# Patient Record
Sex: FEMALE | Race: BLACK OR AFRICAN AMERICAN | NOT HISPANIC OR LATINO | Employment: UNEMPLOYED | URBAN - METROPOLITAN AREA
[De-identification: names, ages, dates, MRNs, and addresses within clinical notes are randomized per-mention and may not be internally consistent; named-entity substitution may affect disease eponyms.]

---

## 2020-12-18 PROBLEM — R10.13 ACUTE EPIGASTRIC PAIN: Status: ACTIVE | Noted: 2020-12-18

## 2021-05-04 ENCOUNTER — PATIENT MESSAGE (OUTPATIENT)
Dept: RESEARCH | Facility: HOSPITAL | Age: 46
End: 2021-05-04

## 2021-05-10 ENCOUNTER — PATIENT MESSAGE (OUTPATIENT)
Dept: RESEARCH | Facility: HOSPITAL | Age: 46
End: 2021-05-10

## 2021-12-02 ENCOUNTER — OFFICE VISIT (OUTPATIENT)
Dept: FAMILY MEDICINE | Facility: HOSPITAL | Age: 46
End: 2021-12-02
Attending: FAMILY MEDICINE
Payer: MEDICAID

## 2021-12-02 ENCOUNTER — HOSPITAL ENCOUNTER (OUTPATIENT)
Dept: RADIOLOGY | Facility: HOSPITAL | Age: 46
Discharge: HOME | End: 2021-12-02
Attending: STUDENT IN AN ORGANIZED HEALTH CARE EDUCATION/TRAINING PROGRAM
Payer: MEDICAID

## 2021-12-02 VITALS
SYSTOLIC BLOOD PRESSURE: 132 MMHG | DIASTOLIC BLOOD PRESSURE: 87 MMHG | WEIGHT: 231.25 LBS | BODY MASS INDEX: 35.05 KG/M2 | HEIGHT: 68 IN | HEART RATE: 105 BPM

## 2021-12-02 DIAGNOSIS — M25.561 ACUTE PAIN OF RIGHT KNEE: ICD-10-CM

## 2021-12-02 DIAGNOSIS — E66.9 OBESITY, UNSPECIFIED CLASSIFICATION, UNSPECIFIED OBESITY TYPE, UNSPECIFIED WHETHER SERIOUS COMORBIDITY PRESENT: ICD-10-CM

## 2021-12-02 DIAGNOSIS — N92.6 IRREGULAR MENSES: ICD-10-CM

## 2021-12-02 DIAGNOSIS — R20.2 PARESTHESIA OF BOTH FEET: ICD-10-CM

## 2021-12-02 DIAGNOSIS — G43.109 MIGRAINE WITH AURA AND WITHOUT STATUS MIGRAINOSUS, NOT INTRACTABLE: ICD-10-CM

## 2021-12-02 DIAGNOSIS — M25.561 ACUTE PAIN OF RIGHT KNEE: Primary | ICD-10-CM

## 2021-12-02 DIAGNOSIS — M47.26 OSTEOARTHRITIS OF SPINE WITH RADICULOPATHY, LUMBAR REGION: ICD-10-CM

## 2021-12-02 PROCEDURE — 73562 X-RAY EXAM OF KNEE 3: CPT | Mod: TC,FY,RT

## 2021-12-02 PROCEDURE — 73562 X-RAY EXAM OF KNEE 3: CPT | Mod: 26,RT,, | Performed by: RADIOLOGY

## 2021-12-02 PROCEDURE — 99214 OFFICE O/P EST MOD 30 MIN: CPT | Performed by: STUDENT IN AN ORGANIZED HEALTH CARE EDUCATION/TRAINING PROGRAM

## 2021-12-02 PROCEDURE — 73562 XR KNEE 3 VIEW RIGHT: ICD-10-PCS | Mod: 26,RT,, | Performed by: RADIOLOGY

## 2022-01-05 ENCOUNTER — HOSPITAL ENCOUNTER (OUTPATIENT)
Dept: RADIOLOGY | Facility: HOSPITAL | Age: 47
Discharge: HOME | End: 2022-01-05
Attending: STUDENT IN AN ORGANIZED HEALTH CARE EDUCATION/TRAINING PROGRAM
Payer: MEDICAID

## 2022-01-05 DIAGNOSIS — N92.6 IRREGULAR MENSES: ICD-10-CM

## 2022-01-05 PROCEDURE — 77067 MAMMO DIGITAL SCREENING BILAT WITH TOMO: ICD-10-PCS | Mod: 26,,, | Performed by: RADIOLOGY

## 2022-01-05 PROCEDURE — 77063 BREAST TOMOSYNTHESIS BI: CPT | Mod: 26,,, | Performed by: RADIOLOGY

## 2022-01-05 PROCEDURE — 77067 SCR MAMMO BI INCL CAD: CPT | Mod: 26,,, | Performed by: RADIOLOGY

## 2022-01-05 PROCEDURE — 77063 MAMMO DIGITAL SCREENING BILAT WITH TOMO: ICD-10-PCS | Mod: 26,,, | Performed by: RADIOLOGY

## 2022-01-05 PROCEDURE — 77067 SCR MAMMO BI INCL CAD: CPT | Mod: TC

## 2022-01-05 PROCEDURE — 77063 BREAST TOMOSYNTHESIS BI: CPT | Mod: TC

## 2023-02-10 ENCOUNTER — OFFICE VISIT (OUTPATIENT)
Dept: URGENT CARE | Facility: CLINIC | Age: 48
End: 2023-02-10
Payer: MEDICAID

## 2023-02-10 VITALS
SYSTOLIC BLOOD PRESSURE: 122 MMHG | RESPIRATION RATE: 16 BRPM | BODY MASS INDEX: 35.01 KG/M2 | TEMPERATURE: 98 F | DIASTOLIC BLOOD PRESSURE: 84 MMHG | OXYGEN SATURATION: 97 % | HEIGHT: 68 IN | WEIGHT: 231 LBS | HEART RATE: 84 BPM

## 2023-02-10 DIAGNOSIS — M54.30 SCIATICA, UNSPECIFIED LATERALITY: ICD-10-CM

## 2023-02-10 DIAGNOSIS — Z79.899 HIGH RISK MEDICATION USE: Primary | ICD-10-CM

## 2023-02-10 LAB — GLUCOSE SERPL-MCNC: 80 MG/DL (ref 70–110)

## 2023-02-10 PROCEDURE — 3074F PR MOST RECENT SYSTOLIC BLOOD PRESSURE < 130 MM HG: ICD-10-PCS | Mod: CPTII,S$GLB,, | Performed by: FAMILY MEDICINE

## 2023-02-10 PROCEDURE — 3074F SYST BP LT 130 MM HG: CPT | Mod: CPTII,S$GLB,, | Performed by: FAMILY MEDICINE

## 2023-02-10 PROCEDURE — 3079F PR MOST RECENT DIASTOLIC BLOOD PRESSURE 80-89 MM HG: ICD-10-PCS | Mod: CPTII,S$GLB,, | Performed by: FAMILY MEDICINE

## 2023-02-10 PROCEDURE — 99213 PR OFFICE/OUTPT VISIT, EST, LEVL III, 20-29 MIN: ICD-10-PCS | Mod: S$GLB,,, | Performed by: FAMILY MEDICINE

## 2023-02-10 PROCEDURE — 1160F RVW MEDS BY RX/DR IN RCRD: CPT | Mod: CPTII,S$GLB,, | Performed by: FAMILY MEDICINE

## 2023-02-10 PROCEDURE — 82962 POCT GLUCOSE, HAND-HELD DEVICE: ICD-10-PCS | Mod: S$GLB,,, | Performed by: FAMILY MEDICINE

## 2023-02-10 PROCEDURE — 3008F BODY MASS INDEX DOCD: CPT | Mod: CPTII,S$GLB,, | Performed by: FAMILY MEDICINE

## 2023-02-10 PROCEDURE — 82962 GLUCOSE BLOOD TEST: CPT | Mod: S$GLB,,, | Performed by: FAMILY MEDICINE

## 2023-02-10 PROCEDURE — 99213 OFFICE O/P EST LOW 20 MIN: CPT | Mod: S$GLB,,, | Performed by: FAMILY MEDICINE

## 2023-02-10 PROCEDURE — 3079F DIAST BP 80-89 MM HG: CPT | Mod: CPTII,S$GLB,, | Performed by: FAMILY MEDICINE

## 2023-02-10 PROCEDURE — 1160F PR REVIEW ALL MEDS BY PRESCRIBER/CLIN PHARMACIST DOCUMENTED: ICD-10-PCS | Mod: CPTII,S$GLB,, | Performed by: FAMILY MEDICINE

## 2023-02-10 PROCEDURE — 1159F MED LIST DOCD IN RCRD: CPT | Mod: CPTII,S$GLB,, | Performed by: FAMILY MEDICINE

## 2023-02-10 PROCEDURE — 1159F PR MEDICATION LIST DOCUMENTED IN MEDICAL RECORD: ICD-10-PCS | Mod: CPTII,S$GLB,, | Performed by: FAMILY MEDICINE

## 2023-02-10 PROCEDURE — 3008F PR BODY MASS INDEX (BMI) DOCUMENTED: ICD-10-PCS | Mod: CPTII,S$GLB,, | Performed by: FAMILY MEDICINE

## 2023-02-10 RX ORDER — METHYLPREDNISOLONE 4 MG/1
TABLET ORAL
Qty: 21 EACH | Refills: 0 | Status: SHIPPED | OUTPATIENT
Start: 2023-02-10 | End: 2023-03-03

## 2023-02-10 RX ORDER — TIZANIDINE 4 MG/1
4 TABLET ORAL EVERY 8 HOURS
Qty: 30 TABLET | Refills: 0 | Status: SHIPPED | OUTPATIENT
Start: 2023-02-10 | End: 2023-02-20

## 2023-02-10 RX ORDER — TRAMADOL HYDROCHLORIDE 50 MG/1
50 TABLET ORAL EVERY 6 HOURS
Qty: 21 EACH | Refills: 0 | Status: SHIPPED | OUTPATIENT
Start: 2023-02-10 | End: 2023-08-04

## 2023-02-10 NOTE — PROGRESS NOTES
"Subjective:       Patient ID: Pilar Lester is a 47 y.o. female.    Vitals:  height is 5' 8" (1.727 m) and weight is 104.8 kg (231 lb).     Chief Complaint: Sciatica    Patient presents to the clinic with sciatic nerve pain x Sunday.  Not getting better, has gotten worse, robaxin not helping, gabapentin not helping      Pain  This is a recurrent problem. The current episode started in the past 7 days. The problem occurs constantly. The problem has been gradually worsening. Associated symptoms include numbness. The symptoms are aggravated by twisting, walking, stress, standing, bending and exertion. She has tried NSAIDs (gabapentin, muscle relaxer) for the symptoms. The treatment provided no relief.     Constitution: Negative.   HENT: Negative.     Neck: neck negative.   Cardiovascular: Negative.    Eyes: Negative.    Respiratory: Negative.     Gastrointestinal: Negative.    Endocrine: negative.   Genitourinary: Negative.    Musculoskeletal:  Positive for pain, back pain and muscle cramps.   Allergic/Immunologic: Negative.    Neurological:  Positive for numbness and tingling.   Hematologic/Lymphatic: Negative.    Psychiatric/Behavioral: Negative.       Objective:      Physical Exam   Constitutional: She is oriented to person, place, and time. She does not appear ill. She appears distressed. obesity  HENT:   Head: Normocephalic and atraumatic.   Nose: No rhinorrhea or congestion.   Eyes: Conjunctivae are normal. Pupils are equal, round, and reactive to light. Extraocular movement intact   Neck: Neck supple.   Cardiovascular: Normal pulses.   Pulmonary/Chest: Effort normal. No stridor. No respiratory distress.   Abdominal: Normal appearance.   Musculoskeletal:         General: Tenderness present.      Right upper leg: She exhibits tenderness. She exhibits no swelling.      Left upper leg: Normal. She exhibits no swelling.      Right lower leg: She exhibits tenderness. She exhibits no swelling. No edema.      Left " lower leg: Normal.   Neurological: no focal deficit. She is alert, oriented to person, place, and time and at baseline.   Skin: Skin is warm and dry. Capillary refill takes less than 2 seconds.   Psychiatric: Her behavior is normal. Mood, judgment and thought content normal.       Assessment:Plan:     1. High risk medication use  - POCT Glucose, Hand-Held Device    2. Sciatica, unspecified laterality  - methylPREDNISolone (MEDROL DOSEPACK) 4 mg tablet; use as directed  Dispense: 21 each; Refill: 0  - tiZANidine (ZANAFLEX) 4 MG tablet; Take 1 tablet (4 mg total) by mouth every 8 (eight) hours. for 10 days  Dispense: 30 tablet; Refill: 0  - traMADoL (ULTRAM) 50 mg tablet; Take 1 tablet (50 mg total) by mouth every 6 (six) hours.  Dispense: 21 each; Refill: 0     All results discussed with patient prior to discharge from clinic

## 2023-05-08 ENCOUNTER — OFFICE VISIT (OUTPATIENT)
Dept: URGENT CARE | Facility: CLINIC | Age: 48
End: 2023-05-08
Payer: MEDICAID

## 2023-05-08 VITALS
RESPIRATION RATE: 19 BRPM | BODY MASS INDEX: 35.01 KG/M2 | SYSTOLIC BLOOD PRESSURE: 122 MMHG | DIASTOLIC BLOOD PRESSURE: 84 MMHG | WEIGHT: 231 LBS | OXYGEN SATURATION: 97 % | HEIGHT: 68 IN | HEART RATE: 91 BPM | TEMPERATURE: 98 F

## 2023-05-08 DIAGNOSIS — R25.2 LEG CRAMPS: Primary | ICD-10-CM

## 2023-05-08 PROCEDURE — 99203 PR OFFICE/OUTPT VISIT, NEW, LEVL III, 30-44 MIN: ICD-10-PCS | Mod: S$GLB,,, | Performed by: NURSE PRACTITIONER

## 2023-05-08 PROCEDURE — 99203 OFFICE O/P NEW LOW 30 MIN: CPT | Mod: S$GLB,,, | Performed by: NURSE PRACTITIONER

## 2023-05-08 RX ORDER — TIZANIDINE 4 MG/1
4 TABLET ORAL EVERY 12 HOURS PRN
Qty: 20 TABLET | Refills: 0 | Status: SHIPPED | OUTPATIENT
Start: 2023-05-08 | End: 2023-05-18

## 2023-05-08 NOTE — PROGRESS NOTES
"Subjective:      Patient ID: Pilar Lester is a 47 y.o. female.    Vitals:  height is 5' 8" (1.727 m) and weight is 104.8 kg (231 lb). Her temperature is 98.3 °F (36.8 °C). Her blood pressure is 122/84 and her pulse is 91. Her respiration is 19 and oxygen saturation is 97%.     Chief Complaint: Leg Pain (Right leg )    Pt present with right leg  ( LOWER CALF) pain with cramping and tingling in her toes that started on Friday. No Known trauma.     Provider note begins below:    Pt comes to the clinic with complaint of right lateral calf pain.  Worse since 3 days ago, but has had intermittently chronically.    Tried muscle relaxer and gabapentin in past with limited relief.  States she feels she has insufficient water intake but takes a multi vitamin. No difficulty with ambulation. Pain worse at night. No numbness or paresthesias.     No trauma or injury.    Leg Pain   The incident occurred 3 to 5 days ago. The incident occurred at home. There was no injury mechanism. The pain is present in the right leg. The quality of the pain is described as cramping and aching. The pain is at a severity of 4/10. The pain is mild. The pain has been Constant since onset. Associated symptoms include an inability to bear weight and tingling. Pertinent negatives include no numbness. She reports no foreign bodies present. Treatments tried: Gabapentin and Muscle relaxer. The treatment provided no relief.     Skin:  Negative for erythema.   Neurological:  Negative for numbness.    Objective:     Physical Exam   Constitutional: She is oriented to person, place, and time. She appears well-developed. No distress.   HENT:   Head: Normocephalic and atraumatic. Head is without abrasion, without contusion and without laceration.   Ears:   Right Ear: External ear normal.   Left Ear: External ear normal.   Nose: Nose normal.   Mouth/Throat: Oropharynx is clear and moist and mucous membranes are normal.   Eyes: Conjunctivae, EOM and lids are " normal. Pupils are equal, round, and reactive to light.   Neck: Trachea normal and phonation normal. Neck supple.   Cardiovascular: Normal rate, regular rhythm and normal heart sounds.   Pulses:       Posterior tibial pulses are 2+ on the right side.   Pulmonary/Chest: Effort normal and breath sounds normal. No stridor. No respiratory distress.   Musculoskeletal: Normal range of motion.         General: Normal range of motion.      Right lower leg: She exhibits no tenderness, no bony tenderness, no swelling and no laceration. No edema.        Legs:    Neurological: She is alert and oriented to person, place, and time.   Skin: Skin is warm, dry, intact and no rash. Capillary refill takes less than 2 seconds. not right lower legNo abrasion, No burn, No bruising, No erythema and No ecchymosis   Psychiatric: Her speech is normal and behavior is normal. Judgment and thought content normal.   Nursing note and vitals reviewed.    Assessment:     1. Leg cramps        Plan:       Leg cramps  -     tiZANidine (ZANAFLEX) 4 MG tablet; Take 1 tablet (4 mg total) by mouth every 12 (twelve) hours as needed (muscle cramps and spasms).  Dispense: 20 tablet; Refill: 0    Pt will follow up with her PCP for acute on chronic leg cramping. She will manage with NSAID and muscle relaxer and increase fluid intake (water).

## 2023-05-12 ENCOUNTER — OFFICE VISIT (OUTPATIENT)
Dept: FAMILY MEDICINE | Facility: HOSPITAL | Age: 48
End: 2023-05-12
Payer: MEDICAID

## 2023-05-12 VITALS
BODY MASS INDEX: 33.91 KG/M2 | WEIGHT: 223.75 LBS | HEART RATE: 88 BPM | DIASTOLIC BLOOD PRESSURE: 91 MMHG | SYSTOLIC BLOOD PRESSURE: 130 MMHG | HEIGHT: 68 IN

## 2023-05-12 DIAGNOSIS — F41.1 GAD (GENERALIZED ANXIETY DISORDER): Primary | ICD-10-CM

## 2023-05-12 DIAGNOSIS — Z12.11 ENCOUNTER FOR SCREENING FOR MALIGNANT NEOPLASM OF COLON: ICD-10-CM

## 2023-05-12 DIAGNOSIS — Z12.31 ENCOUNTER FOR SCREENING MAMMOGRAM FOR MALIGNANT NEOPLASM OF BREAST: ICD-10-CM

## 2023-05-12 DIAGNOSIS — Z11.4 ENCOUNTER FOR SCREENING FOR HIV: ICD-10-CM

## 2023-05-12 DIAGNOSIS — Z13.1 ENCOUNTER FOR SCREENING FOR DIABETES MELLITUS: ICD-10-CM

## 2023-05-12 DIAGNOSIS — Z11.59 ENCOUNTER FOR HEPATITIS C SCREENING TEST FOR LOW RISK PATIENT: ICD-10-CM

## 2023-05-12 DIAGNOSIS — Z13.6 ENCOUNTER FOR SCREENING FOR CARDIOVASCULAR DISORDERS: ICD-10-CM

## 2023-05-12 PROCEDURE — 99214 OFFICE O/P EST MOD 30 MIN: CPT | Performed by: STUDENT IN AN ORGANIZED HEALTH CARE EDUCATION/TRAINING PROGRAM

## 2023-05-12 RX ORDER — HYDROXYZINE PAMOATE 25 MG/1
25 CAPSULE ORAL 4 TIMES DAILY
Qty: 30 CAPSULE | Refills: 0 | Status: SHIPPED | OUTPATIENT
Start: 2023-05-12 | End: 2023-08-04

## 2023-05-12 RX ORDER — URSODIOL 250 MG/1
TABLET, FILM COATED ORAL
COMMUNITY
End: 2023-08-04

## 2023-05-12 RX ORDER — GABAPENTIN 100 MG/1
CAPSULE ORAL
COMMUNITY

## 2023-05-12 RX ORDER — PHENTERMINE HYDROCHLORIDE 37.5 MG/1
18.75 TABLET ORAL 2 TIMES DAILY
COMMUNITY
Start: 2023-04-22 | End: 2023-10-11

## 2023-05-12 RX ORDER — ALBUTEROL SULFATE 0.63 MG/3ML
0.63 SOLUTION RESPIRATORY (INHALATION) EVERY 6 HOURS PRN
Qty: 75 ML | Status: CANCELLED | OUTPATIENT
Start: 2023-05-12

## 2023-05-12 RX ORDER — ALPRAZOLAM 0.5 MG/1
0.5 TABLET ORAL 3 TIMES DAILY
Status: CANCELLED | OUTPATIENT
Start: 2023-05-12

## 2023-05-12 RX ORDER — ALBUTEROL SULFATE 0.63 MG/3ML
SOLUTION RESPIRATORY (INHALATION)
COMMUNITY

## 2023-05-12 RX ORDER — SERTRALINE HYDROCHLORIDE 25 MG/1
25 TABLET, FILM COATED ORAL DAILY
Qty: 30 TABLET | Refills: 11 | Status: SHIPPED | OUTPATIENT
Start: 2023-05-12 | End: 2023-08-07

## 2023-05-13 NOTE — PROGRESS NOTES
PROGRESS NOTE  [facility]    Subjective:       Patient ID: Pilar Lester is a 47 y.o. female.    Chief Complaint: Annual Exam and Anxiety      47-year-old female past medical history of back pain, knee pain in paresthesias of both feet who came to the clinic after being seen in urgent care for symptoms of foot twitching this past weekend.  States that she felt that her right great and 2nd toe continued to twitch involuntarily when she was sitting or standing.  Was given muscle relaxers in urgent care at her symptoms have since resolved after 2 days therapy.  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  Discussed that some of her feelings of twitching as may be linked to her anxiety which she was in agreement with.  Patient is not currently interested in therapy and feels that she is well supported at home.  Previously has seen a psychiatrist who prescribed her p.r.n. Xanax.  Rarely takes her Xanax, only when extremely anxious overwhelmed.  Discussed that we would not refill her medications that she would need to see Psychiatry to refill the Xanax.  However discussed that she would likely benefit from being on a maintenance anxiolytic an SSRI which patient was amenable to.  Patient is also due for healthcare screening mammogram, it hepatitis-C, HIV colon cancer screening.    Review of Systems   Constitutional:  Negative for unexpected weight change.   HENT:  Negative for congestion, rhinorrhea, sneezing and sore throat.    Eyes:  Negative for redness.   Respiratory:  Negative for cough and shortness of breath.    Cardiovascular:  Negative for chest pain.   Gastrointestinal:  Negative for abdominal pain, constipation and diarrhea.   Genitourinary:  Negative for pelvic pain.   Musculoskeletal:  Negative for arthralgias and joint swelling.   Skin:  Negative for color change and pallor.   Psychiatric/Behavioral:  The  patient is nervous/anxious.      Objective:      Vitals:    05/12/23 1056   BP: (!) 130/91   Pulse: 88     Body mass index is 34.02 kg/m².  Physical Exam  Vitals and nursing note reviewed.   Constitutional:       Appearance: Normal appearance.   HENT:      Head: Normocephalic and atraumatic.      Right Ear: Tympanic membrane normal.      Left Ear: Tympanic membrane normal.      Mouth/Throat:      Mouth: Mucous membranes are moist.      Pharynx: Oropharynx is clear.   Eyes:      Extraocular Movements: Extraocular movements intact.      Pupils: Pupils are equal, round, and reactive to light.   Cardiovascular:      Rate and Rhythm: Normal rate and regular rhythm.      Pulses: Normal pulses.      Heart sounds: Normal heart sounds.   Pulmonary:      Effort: Pulmonary effort is normal.      Breath sounds: Normal breath sounds.   Abdominal:      General: Abdomen is flat.      Palpations: Abdomen is soft.   Musculoskeletal:         General: Normal range of motion.      Cervical back: Normal range of motion.   Skin:     General: Skin is warm.   Neurological:      General: No focal deficit present.      Mental Status: She is alert and oriented to person, place, and time.   Psychiatric:         Mood and Affect: Mood normal.      Comments: FRANCI 7: 13  PHQ 9: 12       Assessment:       1. FRANCI (generalized anxiety disorder)    2. Encounter for screening for diabetes mellitus    3. Encounter for screening for cardiovascular disorders    4. Encounter for screening for HIV    5. Encounter for hepatitis C screening test for low risk patient    6. Encounter for screening mammogram for malignant neoplasm of breast    7. Encounter for screening for malignant neoplasm of colon        47-year-old female who presented to clinic due to symptoms of leg twitching in his that has since resolved however has significant anxiety which affects her daily life not currently well controlled    Plan:       Will start Zoloft for patient's anxiety,  prescribed Vistaril as well for breakthrough anxiety in lieu of Xanax encouraged patient to follow up with psychiatrist as needed, will also check CBC, CMP order mammogram, hep C, HIV screening also ordered Cologuard.  Patient is also due for Pap smear, will do that at next visit in 1 month after follow-up on SSRI response.    I've explained to her that drugs of the SSRI class can have side effects such as weight gain, sexual dysfunction, insomnia, headache, nausea. These medications are generally effective at alleviating symptoms of anxiety and/or depression. Let me know if significant side effects do occur.      FRANCI (generalized anxiety disorder)  -     sertraline (ZOLOFT) 25 MG tablet; Take 1 tablet (25 mg total) by mouth once daily.  Dispense: 30 tablet; Refill: 11  -     hydrOXYzine pamoate (VISTARIL) 25 MG Cap; Take 1 capsule (25 mg total) by mouth 4 (four) times daily.  Dispense: 30 capsule; Refill: 0    Encounter for screening for diabetes mellitus  -     Hemoglobin A1C; Future; Expected date: 05/12/2023    Encounter for screening for cardiovascular disorders  -     CBC Auto Differential; Future; Expected date: 05/12/2023  -     Comprehensive Metabolic Panel; Future; Expected date: 05/12/2023  -     Lipid Panel; Future; Expected date: 05/12/2023    Encounter for screening for HIV  -     HIV 1/2 Ag/Ab (4th Gen); Future; Expected date: 05/12/2023    Encounter for hepatitis C screening test for low risk patient  -     Hepatitis C Antibody; Future; Expected date: 05/12/2023    Encounter for screening mammogram for malignant neoplasm of breast  -     Mammo Digital Screening Bilat w/ Rashard; Future; Expected date: 05/12/2023    Encounter for screening for malignant neoplasm of colon  -     Cologuard Screening (Multitarget Stool DNA); Future; Expected date: 05/12/2023        Follow up in: 1 month, for pap        Bob Luo MD, MPH  [facility], PGY-2    This note was partially created using [employer] Voice  Recognition software. Typographical and content errors may occur with this process. While efforts are made to detect and correct such errors, in some cases errors will persist. For this reason, wording in this document should be considered in the proper context and not strictly verbatim.

## 2023-05-25 ENCOUNTER — HOSPITAL ENCOUNTER (OUTPATIENT)
Dept: RADIOLOGY | Facility: HOSPITAL | Age: 48
Discharge: HOME | End: 2023-05-25
Attending: STUDENT IN AN ORGANIZED HEALTH CARE EDUCATION/TRAINING PROGRAM
Payer: MEDICAID

## 2023-05-25 DIAGNOSIS — Z12.31 ENCOUNTER FOR SCREENING MAMMOGRAM FOR MALIGNANT NEOPLASM OF BREAST: ICD-10-CM

## 2023-05-25 PROCEDURE — 77067 SCR MAMMO BI INCL CAD: CPT | Mod: TC

## 2023-05-25 PROCEDURE — 77067 MAMMO DIGITAL SCREENING BILAT WITH TOMO: ICD-10-PCS | Mod: 26,,, | Performed by: RADIOLOGY

## 2023-05-25 PROCEDURE — 77067 SCR MAMMO BI INCL CAD: CPT | Mod: 26,,, | Performed by: RADIOLOGY

## 2023-05-25 PROCEDURE — 77063 MAMMO DIGITAL SCREENING BILAT WITH TOMO: ICD-10-PCS | Mod: 26,,, | Performed by: RADIOLOGY

## 2023-05-25 PROCEDURE — 77063 BREAST TOMOSYNTHESIS BI: CPT | Mod: 26,,, | Performed by: RADIOLOGY

## 2023-08-01 ENCOUNTER — OFFICE VISIT (OUTPATIENT)
Dept: FAMILY MEDICINE | Facility: HOSPITAL | Age: 48
End: 2023-08-01
Payer: MEDICAID

## 2023-08-01 ENCOUNTER — HOSPITAL ENCOUNTER (OUTPATIENT)
Dept: RADIOLOGY | Facility: HOSPITAL | Age: 48
Discharge: HOME | End: 2023-08-01
Attending: STUDENT IN AN ORGANIZED HEALTH CARE EDUCATION/TRAINING PROGRAM
Payer: MEDICAID

## 2023-08-01 VITALS
SYSTOLIC BLOOD PRESSURE: 124 MMHG | DIASTOLIC BLOOD PRESSURE: 81 MMHG | WEIGHT: 218.06 LBS | BODY MASS INDEX: 33.05 KG/M2 | HEART RATE: 100 BPM | HEIGHT: 68 IN

## 2023-08-01 DIAGNOSIS — M25.562 ACUTE PAIN OF LEFT KNEE: Primary | ICD-10-CM

## 2023-08-01 DIAGNOSIS — M25.562 ACUTE PAIN OF LEFT KNEE: ICD-10-CM

## 2023-08-01 PROCEDURE — 99214 OFFICE O/P EST MOD 30 MIN: CPT | Performed by: STUDENT IN AN ORGANIZED HEALTH CARE EDUCATION/TRAINING PROGRAM

## 2023-08-01 PROCEDURE — 73564 X-RAY EXAM KNEE 4 OR MORE: CPT | Mod: TC,FY,LT

## 2023-08-01 PROCEDURE — 73564 X-RAY EXAM KNEE 4 OR MORE: CPT | Mod: 26,LT,, | Performed by: RADIOLOGY

## 2023-08-01 PROCEDURE — 73564 XR KNEE COMP 4 OR MORE VIEWS LEFT: ICD-10-PCS | Mod: 26,LT,, | Performed by: RADIOLOGY

## 2023-08-01 RX ORDER — METHOCARBAMOL 500 MG/1
500 TABLET, FILM COATED ORAL 3 TIMES DAILY PRN
Qty: 30 TABLET | Refills: 0 | Status: SHIPPED | OUTPATIENT
Start: 2023-08-01 | End: 2023-08-07

## 2023-08-01 RX ORDER — NAPROXEN 500 MG/1
500 TABLET ORAL 2 TIMES DAILY WITH MEALS
Qty: 28 TABLET | Refills: 0 | Status: SHIPPED | OUTPATIENT
Start: 2023-08-01 | End: 2023-08-07 | Stop reason: ALTCHOICE

## 2023-08-02 ENCOUNTER — PATIENT MESSAGE (OUTPATIENT)
Dept: FAMILY MEDICINE | Facility: HOSPITAL | Age: 48
End: 2023-08-02
Payer: MEDICAID

## 2023-08-02 NOTE — PROGRESS NOTES
PRO[facility]    Subjective:       Patient ID: Pilar Lester is a 47 y.o. female.    Chief Complaint: Knee Pain (SWELLING LEFT)      47-year-old female past medical history lumbar osteoarthritis pulmonary, who presents to clinic for acute left knee pain.  Patient states that pain started approximately 4 days ago, does not remember any specific inciting injury.  States is worse with flexion than extension.  Also feels painful when twisting and turning.  Patient endorses some swelling of the joint.  States that exercise briefly makes it feel better however pain returns worse shortly afterwards.  No history of prior trauma, never happened to her before.  Has been taking OTC Advil and Tylenol with moderate improvement.  Is amenable to physical therapy.    Review of Systems   Constitutional:  Negative for chills, fever and unexpected weight change.   HENT:  Negative for congestion, rhinorrhea, sneezing and sore throat.    Eyes:  Negative for redness and visual disturbance.   Respiratory:  Negative for cough, shortness of breath and wheezing.    Cardiovascular:  Negative for chest pain and leg swelling.   Gastrointestinal:  Negative for abdominal pain, blood in stool, constipation and diarrhea.   Genitourinary:  Negative for dysuria and hematuria.   Musculoskeletal:  Positive for arthralgias, gait problem and joint swelling.   Skin:  Negative for color change and pallor.   Neurological:  Negative for light-headedness and headaches.   Psychiatric/Behavioral:  Negative for agitation and confusion.        Objective:      Vitals:    08/01/23 1418   BP: 124/81   Pulse: 100     Body mass index is 33.15 kg/m².  Physical Exam  Vitals and nursing note reviewed.   Constitutional:       Appearance: Normal appearance.   HENT:      Head: Normocephalic and atraumatic.      Right Ear: Tympanic membrane normal.      Left Ear: Tympanic membrane normal.      Mouth/Throat:      Mouth: Mucous membranes are moist.       Pharynx: Oropharynx is clear.   Eyes:      Extraocular Movements: Extraocular movements intact.      Pupils: Pupils are equal, round, and reactive to light.   Cardiovascular:      Rate and Rhythm: Normal rate and regular rhythm.      Pulses: Normal pulses.      Heart sounds: Normal heart sounds.   Pulmonary:      Effort: Pulmonary effort is normal.      Breath sounds: Normal breath sounds.   Abdominal:      General: Abdomen is flat.      Palpations: Abdomen is soft.   Musculoskeletal:         General: Normal range of motion.      Cervical back: Normal range of motion.      Comments: Pain to medial joint line of the left knee, positive medial Sean.  No ligamentous laxity noted.  normal right knee   Skin:     General: Skin is warm.   Neurological:      General: No focal deficit present.      Mental Status: She is alert and oriented to person, place, and time.   Psychiatric:         Mood and Affect: Mood normal.       Assessment:       1. Acute pain of left knee        47-year-old female with above past medical history presenting for acute left knee pain likely secondary to meniscal injury  Plan:       Will treat pain conservatively at this time, physical therapy NSAIDs, muscle relaxer.  Will also obtain x-ray to rule out bony pathology.  If patient's pain does not improve can consider CSI injection at next visit.  If does not improve with 4 weeks of physical therapy will consider MRI.  Acute pain of left knee  -     X-Ray Knee Complete 4 or More Views Left; Future; Expected date: 08/01/2023  -     naproxen (NAPROSYN) 500 MG tablet; Take 1 tablet (500 mg total) by mouth 2 (two) times daily with meals. for 14 days  Dispense: 28 tablet; Refill: 0  -     methocarbamoL (ROBAXIN) 500 MG Tab; Take 1 tablet (500 mg total) by mouth 3 (three) times daily as needed (leg pain).  Dispense: 30 tablet; Refill: 0  -     Ambulatory referral/consult to Physical/Occupational Therapy; Future; Expected date: 08/08/2023        Follow  up in: 1-4 weeks based on symtpoms        Bob Luo MD, MPH  [facility], PGY-3    This note was partially created using [employer] Voice Recognition software. Typographical and content errors may occur with this process. While efforts are made to detect and correct such errors, in some cases errors will persist. For this reason, wording in this document should be considered in the proper context and not strictly verbatim.

## 2023-08-07 ENCOUNTER — OFFICE VISIT (OUTPATIENT)
Dept: FAMILY MEDICINE | Facility: HOSPITAL | Age: 48
End: 2023-08-07
Payer: MEDICAID

## 2023-08-07 VITALS
SYSTOLIC BLOOD PRESSURE: 119 MMHG | BODY MASS INDEX: 33.38 KG/M2 | HEART RATE: 107 BPM | WEIGHT: 220.25 LBS | HEIGHT: 68 IN | DIASTOLIC BLOOD PRESSURE: 80 MMHG

## 2023-08-07 DIAGNOSIS — M25.562 ACUTE PAIN OF LEFT KNEE: Primary | ICD-10-CM

## 2023-08-07 PROBLEM — S83.105A DISLOCATION OF LEFT KNEE: Status: ACTIVE | Noted: 2023-08-07

## 2023-08-07 PROCEDURE — 99213 OFFICE O/P EST LOW 20 MIN: CPT

## 2023-08-07 RX ORDER — MELOXICAM 15 MG/1
15 TABLET ORAL DAILY PRN
Qty: 30 TABLET | Refills: 0 | Status: SHIPPED | OUTPATIENT
Start: 2023-08-07 | End: 2023-10-11

## 2023-08-07 NOTE — PROGRESS NOTES
I assume primary medical responsibility for this patient. I have reviewed the history, physical, and assessment & treatment plan with the resident and agree that the care is reasonable and necessary. This service has been performed by a resident with the presence of a teaching physician under the primary care exception. If necessary, an addendum of additional findings or evaluation beyond the resident documentation will be noted below.        Hardeep Aj Jr., DO    [facility]

## 2023-08-07 NOTE — PROGRESS NOTES
I assume primary medical responsibility for this patient. I have reviewed the history, physical, and assessment & treatment plan with the resident and agree that the care is reasonable and necessary. This service has been performed by a resident without the presence of a teaching physician under the primary care exception. If necessary, an addendum of additional findings or evaluation beyond the resident documentation will be noted below.        Hardeep Aj Jr., DO    [facility]      Non-Graft Cartilage Fenestration Text: The cartilage was fenestrated with a 2mm punch biopsy to help facilitate healing.

## 2023-08-07 NOTE — PROGRESS NOTES
"  [facility] FAMILY PRACTICE CLINIC NOTE  Follow-up Visit      SUBJECTIVE:     Patient: Pilar Lester is a 47 y.o. female.    Chief Compliant:   Chief Complaint   Patient presents with    Knee Pain       History of Present Illness:  Patient last seen in clinic last week Tuesday for knee pain. Started on naproxen and methocarbamol, pain refractory to medications. Patient reports minimal relief with these medications in addition to ice and heat compression. Continuing to endorse pain with ambulation and decreased mobility. She would like a corticosteroid injection today.    Review of Systems   Constitutional:  Negative for chills, diaphoresis and fever.   HENT:  Negative for congestion and sore throat.    Eyes:  Negative for blurred vision and pain.   Respiratory:  Negative for cough and shortness of breath.    Cardiovascular:  Negative for chest pain and palpitations.   Gastrointestinal:  Negative for abdominal pain, constipation, diarrhea and nausea.   Genitourinary:  Negative for dysuria and urgency.   Musculoskeletal:  Positive for joint pain. Negative for back pain and myalgias.   Neurological:  Negative for dizziness, weakness and headaches.   Psychiatric/Behavioral:  Negative for depression. The patient is not nervous/anxious.      A 10+ review of systems was performed with pertinent positives and negatives noted above in the history of present illness. Other systems were negative unless otherwise specified.    OBJECTIVE:     Vital Signs (Most Recent)  Vitals:    08/07/23 1050   BP: 119/80   Pulse: 107   Weight: 99.9 kg (220 lb 3.8 oz)   Height: 5' 8" (1.727 m)     BMI: Body mass index is 33.49 kg/m².     Physical Exam:  Physical Exam  Constitutional:       Appearance: Normal appearance. She is normal weight.   HENT:      Head: Normocephalic and atraumatic.      Mouth/Throat:      Mouth: Mucous membranes are moist.      Pharynx: Oropharynx is clear.   Eyes:      Extraocular Movements: Extraocular movements intact.    "   Conjunctiva/sclera: Conjunctivae normal.      Pupils: Pupils are equal, round, and reactive to light.   Cardiovascular:      Rate and Rhythm: Normal rate and regular rhythm.      Pulses: Normal pulses.      Heart sounds: Normal heart sounds.   Pulmonary:      Effort: Pulmonary effort is normal.      Breath sounds: Normal breath sounds.   Abdominal:      General: Abdomen is flat.      Palpations: Abdomen is soft.   Musculoskeletal:         General: Normal range of motion.      Cervical back: Normal range of motion and neck supple.      Left lower leg: Swelling present.      Comments: Left knee mildly tender to palpation medially with mild swelling laterally. Range of motion intact. Right knee normal.    Skin:     General: Skin is warm and dry.      Capillary Refill: Capillary refill takes less than 2 seconds.   Neurological:      General: No focal deficit present.      Mental Status: She is alert and oriented to person, place, and time.      Gait: Gait abnormal.          ASSESSMENT:   Pilar Lester is a 47 y.o. female who presents to clinic to for follow up of acute left knee pain.    1. Acute pain of left knee         PLAN:   Pain refractory to initial treatment with naproxen, methocarbamol, and therapy. Left knee XR done 8/1 negative for fractures or joint effusion. Pain likely stemming from tenosynovitis with left lateral knee swelling on exam. Patient expressed desire for joint injection. Risk and benefits were discussed including bleeding and infection. Patient was informed and consequently consented to the procedure. See procedure note. Patient counseled and advised on continued therapy in addition to strategies for weight loss including adjustments to diet and exercise. Patient to return to clinic in 4 weeks to further assess knee pain.    Acute pain of left knee  -     meloxicam (MOBIC) 15 MG tablet; Take 1 tablet (15 mg total) by mouth daily as needed for Pain.  Dispense: 30 tablet; Refill: 0    Provided  patient with anticipatory guidance and return precautions. Treatment plan discussed with patient, all questions answered, and patient acknowledged understanding and verbal agreement.      Follow-up in: 4 weeks; or sooner PRN if acute concerns arise.      Case discussed with Dr. ROMELIA Bell Do, MD  [facility] PGY-1

## 2023-08-07 NOTE — PROGRESS NOTES
"Procedure:  Knee intra-articular steroid injection     ANESTHETIC:  Topical ethyl chloride     SURGICAL PREP: Alcohol and chlorhexidine     Attending physician: Dr. AJ     LOCATION:   Knee     PREPARATION:  The diagnosis, procedure, alternatives, benefits and risks, including but not limited to: drug reactions, pain, scar, cosmetic defect, local sensation disturbances, and/or  recurrence of present condition were explained to the patient. The patient elected to proceed.     PROCEDURE:  A time-out was performed after both written and verbal consents were obtained. 1ml kenalog (40mg) and 4 cc of 1% lidocaine was drawn into a 1 1/2" 25 G syringe. The patient was positioned correctly on the procedure table. Afterwards, a skin marker was used to sumeet the injection site of the knee.  Chlorhexidine was applied to the injection site. Next, ethyl chloride spray was used as a topic anesthetic and then alcohol swab re-applied in order to maintain sterile technique. The medication was injected into the knee. The patient tolerated the procedure well without any immediate complications. The injection was given by Dr. Hardeep Aj. The patient was instructed to remain in clinic for 20 minutes and to immediately report any adverse reactions. The patient demonstrated immediate relief of pain and was able to tolerate both active and passive ROM. The patient was able to move the knee without difficulty. The patient's pain was well controlled prior to leaving the clinic. All questions and concerns were addressed. The patient was encouraged to contact the clinic with any additional questions/concerns.       "

## 2023-08-18 ENCOUNTER — CLINICAL SUPPORT (OUTPATIENT)
Dept: REHABILITATION | Facility: HOSPITAL | Age: 48
End: 2023-08-18
Attending: STUDENT IN AN ORGANIZED HEALTH CARE EDUCATION/TRAINING PROGRAM
Payer: MEDICAID

## 2023-08-18 DIAGNOSIS — M62.81 QUADRICEPS WEAKNESS: ICD-10-CM

## 2023-08-18 DIAGNOSIS — M25.662 DECREASED RANGE OF MOTION (ROM) OF LEFT KNEE: ICD-10-CM

## 2023-08-18 DIAGNOSIS — M25.562 ACUTE PAIN OF LEFT KNEE: ICD-10-CM

## 2023-08-18 PROCEDURE — 97162 PT EVAL MOD COMPLEX 30 MIN: CPT | Mod: PN

## 2023-08-18 PROCEDURE — 97110 THERAPEUTIC EXERCISES: CPT | Mod: PN

## 2023-08-18 NOTE — PLAN OF CARE
[facility]  Physical Therapy Initial Evaluation    Name: Pilar Lester  [facility] Number: [id number]    Therapy Diagnosis:   Encounter Diagnosis   Name Primary?    Acute pain of left knee         Physician: Hardeep Aj DO    Physician Orders: PT Eval and Treat   Medical Diagnosis from Referral: M25.562 (ICD-10-CM) - Acute pain of left knee  Evaluation Date: 8/18/2023  Authorization Period Expiration: 12/31/2023  Plan of Care Expiration: 9/29/2023  Visit # / Visits authorized: 1/1   FOTO: 1/5    Precautions: Standard     Time In: 8:05 am  Time Out: 9:00 am  Total Appointment Time (timed & untimed codes): 55 minutes    SUBJECTIVE     Date of onset: ~ 1 month     History of current condition - Pilar reports: she started having left knee pain about a month ago. Patient reports she started doing bootcamp at [facility] involving burpees, lunges, and squatting and tried to start walking about 3 miles a day. Patient received a corticosteroid injection about 10 days ago, which provided relief for a few days. Aggravating factors include walking on uneven surfaces, quick movements, and increased activity levels. She stopped doing bootcamp and she decreased her walking demands, but she is still trying to ambulate for exercise. Easing factors are minimal at this time, but she will ice and heat it for relief.      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      Medical History:   Past Medical History:   Diagnosis  "Date    Asthma        Surgical History:   Pilar Lester  has a past surgical history that includes Cholecystectomy; Tubal ligation; Esophagogastroduodenoscopy; and Esophagogastroduodenoscopy (N/A, 12/18/2020).    Medications:   Pilar has a current medication list which includes the following prescription(s): albuterol, gabapentin, meloxicam, phentermine, and ursodiol.    Allergies:   Review of patient's allergies indicates:   Allergen Reactions    Trazodone Swelling      OBJECTIVE     Palpation: tender to palpation at left knee medial joint line    AROM: *denotes pain  right knee: (1-0-128 degrees)  left knee: (0-1*- 92* degrees)    L/E Strength w/ MicroFET Muscle Darius Dynamometer Right Left Pain/Dysfunction with Movement   (approx 4 sec hold w/ max contraction)   Hip Flexion  1.5 kg   4.5 kg     Quadriceps  36.1 kg   17.1 kg   Pain on left    Hamstrings  15.8 kg   10.2 kg   Pain on left          Limitation/Restriction for FOTO Knee Survey    Therapist reviewed FOTO scores for Pilar Lester on 8/18/2023.   FOTO documents entered into [employer] - see Media section.    Limitation Score: 54%  Predicted Limitation Score: 35%         TREATMENT     Total Treatment time (time-based codes) separate from Evaluation: 15 minutes      Pilar received the treatments listed below:      therapeutic exercises to develop strength, endurance, ROM, and flexibility for 15 minutes including:  Quad sets: 5"x15  Straight leg raise: 10x  Heel slides: 10x      PATIENT EDUCATION AND HOME EXERCISES     Education provided:   - home exercise program   - POC/Prognosis     Written Home Exercises Provided: yes. Exercises were reviewed and Pilar was able to demonstrate them prior to the end of the session.  Pilar demonstrated good  understanding of the education provided. See EMR under Patient Instructions for exercises provided during therapy sessions.    ASSESSMENT     Pilar is a 47 y.o. female referred to outpatient Physical Therapy with a medical " diagnosis of acute pain of the left knee. Patient presents with signs and symptoms of a left medial meniscus injury. Objective measures displaying lack of left knee terminal extension and significant decrease in knee flexion range of motion. Displaying left quadriceps strength deficits of approximately 50% in comparison to the left lower extremity. Patient would benefit from skilled PT focusing on regaining left knee range of motion, strength, and stability in order to return to previous level of function.     Patient prognosis is Good.   Patient will benefit from skilled outpatient Physical Therapy to address the deficits stated above and in the chart below, provide patient /family education, and to maximize patientt's level of independence.     Plan of care discussed with patient: Yes  Patient's spiritual, cultural and educational needs considered and patient is agreeable to the plan of care and goals as stated below:     Anticipated Barriers for therapy: current objective measures    Medical Necessity is demonstrated by the following  History  Co-morbidities and personal factors that may impact the plan of care Co-morbidities:   COPD/asthma and high BMI    Personal Factors:   no deficits     moderate   Examination  Body Structures and Functions, activity limitations and participation restrictions that may impact the plan of care Body Regions:   lower extremities    Body Systems:    gross symmetry  ROM  strength  gross coordinated movement  balance  gait  transfers  transitions  motor control  motor learning    Participation Restrictions:   Exercise    Activity limitations:   Learning and applying knowledge  no deficits    General Tasks and Commands  no deficits    Communication  no deficits    Mobility  lifting and carrying objects  walking    Self care  no deficits    Domestic Life  shopping  doing house work (cleaning house, washing dishes, laundry)  assisting others    Interactions/Relationships  no  deficits    Life Areas  no deficits    Community and Social Life  no deficits         high   Clinical Presentation evolving clinical presentation with changing clinical characteristics moderate   Decision Making/ Complexity Score: moderate     Goals:  Short Term Goals:  1. Pt will be independent with HEP supplement PT in improving functional mobility.  2. Pt will improve left LE strength to at least 75% of right LE strength as measured via MicroFet handheld dynamometer in order to improve functional mobility  3. Pt will improve left knee AROM to at least (0-0-105 degrees) in order to improve gait    Long Term Goals:  1. Pt will be independent with updated HEP supplement PT in improving functional mobility.  2. Pt will improve left LE strength to at least 90% of right LE strength as measured via MicroFet handheld dynamometer in order to improve functional mobility  3. Pt will improve left knee AROM to at least (0-0-120 degrees) in order to improve gait and ability to perform ADLs  4. Pt will improve FOTO knee survey score to </=35% limited in order to demo improved functional mobility  5. Pt will perform at least 14 sit to stands without UE support on 30 second sit to stand test in order to demo improved ability to perform transfer      Plan     Plan of care Certification: 8/18/2023 to 9/29/2023.    Outpatient Physical Therapy 1 times weekly for 6 weeks to include the following interventions: Gait Training, Manual Therapy, Moist Heat/ Ice, Neuromuscular Re-ed, Orthotic Management and Training, Patient Education, Therapeutic Activites and Therapeutic Exercise, ASTYM, Kinesiotape    Vitaly Phan, PT, DPT

## 2023-08-22 ENCOUNTER — DOCUMENTATION ONLY (OUTPATIENT)
Dept: REHABILITATION | Facility: HOSPITAL | Age: 48
End: 2023-08-22
Payer: MEDICAID

## 2023-08-22 NOTE — PROGRESS NOTES
Face to face meeting completed with Vitaly Phan  PT regarding current status and progress of   Pilar Lester .  Salazar Ramon, PTA

## 2023-08-23 PROBLEM — M62.81 QUADRICEPS WEAKNESS: Status: ACTIVE | Noted: 2023-08-23

## 2023-08-23 PROBLEM — M25.662 DECREASED RANGE OF MOTION (ROM) OF LEFT KNEE: Status: ACTIVE | Noted: 2023-08-23

## 2023-09-25 ENCOUNTER — ON-DEMAND VIRTUAL (OUTPATIENT)
Dept: URGENT CARE | Facility: CLINIC | Age: 48
End: 2023-09-25
Payer: MEDICAID

## 2023-09-25 DIAGNOSIS — J02.9 SORE THROAT: ICD-10-CM

## 2023-09-25 DIAGNOSIS — B96.89 BACTERIAL UPPER RESPIRATORY INFECTION: Primary | ICD-10-CM

## 2023-09-25 DIAGNOSIS — J06.9 BACTERIAL UPPER RESPIRATORY INFECTION: Primary | ICD-10-CM

## 2023-09-25 PROCEDURE — 99213 OFFICE O/P EST LOW 20 MIN: CPT | Mod: 95,,,

## 2023-09-25 PROCEDURE — 99213 PR OFFICE/OUTPT VISIT, EST, LEVL III, 20-29 MIN: ICD-10-PCS | Mod: 95,,,

## 2023-09-25 RX ORDER — AMOXICILLIN 875 MG/1
875 TABLET, FILM COATED ORAL EVERY 12 HOURS
Qty: 20 TABLET | Refills: 0 | Status: SHIPPED | OUTPATIENT
Start: 2023-09-25 | End: 2023-10-05

## 2023-09-25 NOTE — PATIENT INSTRUCTIONS
Please return here or go to the Emergency Department for any concerns or worsening of condition.  Please drink plenty of fluids.  Please get plenty of rest.  If you were prescribed antibiotics, please take them to completion.  If you were given wait & see antibiotics, please wait 5-7 days before taking them, and only take them if your symptoms have worsened or not improved.  If you do begin taking the antibiotics, please take them to completion.  If you were given a steroid shot in the clinic and have also been given a prescription for a steroid such as Prednisone or a Medrol Dose Pack, please begin taking them tomorrow.  If you do not have Hypertension or any history of palpitations, it is ok to take over the counter Sudafed or Mucinex D or Allegra-D or Claritin-D or Zyrtec-D.  If you do take one of the above, it is ok to combine that with plain over the counter Mucinex or Allegra or Claritin or Zyrtec.  If for example you are taking Zyrtec -D, you can combine that with Mucinex, but not Mucinex-D.  If you are taking Mucinex-D, you can combine that with plain Allegra or Claritin or Zyrtec.   If you do have Hypertension or palpitations, it is safe to take Coricidin HBP for relief of sinus symptoms.  If not allergic, please take over the counter Tylenol (Acetaminophen) and/or Motrin (Ibuprofen) as directed for control of pain and/or fever.  Please follow up with your primary care doctor or specialist as needed.    If you  smoke, please stop smoking. Please return here or go to the Emergency Department for any concerns or worsening of condition.  Please drink plenty of fluids.  Please get plenty of rest.  If you were prescribed antibiotics, please take them to completion.  If you were given wait & see antibiotics, please wait 5-7 days before taking them, and only take them if your symptoms have worsened or not improved.  If you do begin taking the antibiotics, please take them to completion.  If you were given a  steroid shot in the clinic and have also been given a prescription for a steroid such as Prednisone or a Medrol Dose Pack, please begin taking them tomorrow.  If you do not have Hypertension or any history of palpitations, it is ok to take over the counter Sudafed or Mucinex D or Allegra-D or Claritin-D or Zyrtec-D.  If you do take one of the above, it is ok to combine that with plain over the counter Mucinex or Allegra or Claritin or Zyrtec.  If for example you are taking Zyrtec -D, you can combine that with Mucinex, but not Mucinex-D.  If you are taking Mucinex-D, you can combine that with plain Allegra or Claritin or Zyrtec.   If you do have Hypertension or palpitations, it is safe to take Coricidin HBP for relief of sinus symptoms.  If not allergic, please take over the counter Tylenol (Acetaminophen) and/or Motrin (Ibuprofen) as directed for control of pain and/or fever.  Please follow up with your primary care doctor or specialist as needed.    If you  smoke, please stop smoking. Please return here or go to the Emergency Department for any concerns or worsening of condition.  Please drink plenty of fluids.  Please get plenty of rest.  If you were prescribed antibiotics, please take them to completion.  If you were given wait & see antibiotics, please wait 5-7 days before taking them, and only take them if your symptoms have worsened or not improved.  If you do begin taking the antibiotics, please take them to completion.  If you were given a steroid shot in the clinic and have also been given a prescription for a steroid such as Prednisone or a Medrol Dose Pack, please begin taking them tomorrow.  If you do not have Hypertension or any history of palpitations, it is ok to take over the counter Sudafed or Mucinex D or Allegra-D or Claritin-D or Zyrtec-D.  If you do take one of the above, it is ok to combine that with plain over the counter Mucinex or Allegra or Claritin or Zyrtec.  If for example you are taking  Zyrtec -D, you can combine that with Mucinex, but not Mucinex-D.  If you are taking Mucinex-D, you can combine that with plain Allegra or Claritin or Zyrtec.   If you do have Hypertension or palpitations, it is safe to take Coricidin HBP for relief of sinus symptoms.  If not allergic, please take over the counter Tylenol (Acetaminophen) and/or Motrin (Ibuprofen) as directed for control of pain and/or fever.  Please follow up with your primary care doctor or specialist as needed.    If you  smoke, please stop smoking.

## 2023-09-25 NOTE — PROGRESS NOTES
Subjective:      Patient ID: Pilar Lester is a 47 y.o. female.    Vitals:  vitals were not taken for this visit.     Chief Complaint: Sinus Problem      Visit Type: TELE AUDIOVISUAL    Present with the patient at the time of consultation: TELEMED PRESENT WITH PATIENT: None    Past Medical History:   Diagnosis Date    Asthma      Past Surgical History:   Procedure Laterality Date    CHOLECYSTECTOMY      ESOPHAGOGASTRODUODENOSCOPY      ESOPHAGOGASTRODUODENOSCOPY N/A 12/18/2020    Procedure: EGD (ESOPHAGOGASTRODUODENOSCOPY);  Surgeon: Markel Duff MD;  Location: [facility];  Service: Endoscopy;  Laterality: N/A;    TUBAL LIGATION       Review of patient's allergies indicates:   Allergen Reactions    Trazodone Swelling     Current Outpatient Medications on File Prior to Visit   Medication Sig Dispense Refill    albuterol (ACCUNEB) 0.63 mg/3 mL Nebu albuterol sulfate Take No date recorded No form recorded No frequency recorded No route recorded No set duration recorded No set duration amount recorded active No dosage strength recorded No dosage strength units of measure recorded      gabapentin (NEURONTIN) 100 MG capsule gabapentin Take No date recorded No form recorded No frequency recorded No route recorded No set duration recorded No set duration amount recorded active No dosage strength recorded No dosage strength units of measure recorded      meloxicam (MOBIC) 15 MG tablet Take 1 tablet (15 mg total) by mouth daily as needed for Pain. 30 tablet 0    phentermine (ADIPEX-P) 37.5 mg tablet Take 18.75 mg by mouth 2 (two) times daily.      ursodioL (ACTIGALL) 500 MG tablet Take 500 mg by mouth 3 (three) times daily.       No current facility-administered medications on file prior to visit.     Family History   Problem Relation Age of Onset    Breast cancer Mother 50       Medications Ordered                [id number] - [place], [place] - [address]   [address], [place] [address]    Telephone: [phone]    Fax: [phone]   Hours: Not open 24 hours                         E-Prescribed (1 of 1)              amoxicillin (AMOXIL) 875 MG tablet    Sig: Take 1 tablet (875 mg total) by mouth every 12 (twelve) hours. for 10 days       Start: 9/25/23     Quantity: 20 tablet Refills: 0                           Ohs Peq Odvv Intake    9/25/2023 10:54 AM CDT - Filed by Patient   Describe your reason for todays visit Sore throat   What is your current physical address in the event of a medical emergency? [place] [id number]   Are you able to take your vital signs? No   Please attach any relevant images or files          Patient states that Saturday night she began to have a sore throat. Patient states that she has pain when she swallows. Patient states that she has taken an otc covid and it was negative. Patient states that she is almost unable to swallow due to the pain. Patient states that she is also having nasal drainage. Patient states that her nasal drainage is green in color.         Constitution: Negative.   HENT:  Positive for congestion, postnasal drip and sore throat.    Neck: neck negative.   Cardiovascular: Negative.    Eyes: Negative.    Respiratory: Negative.     Gastrointestinal: Negative.    Endocrine: negative.   Genitourinary: Negative.    Musculoskeletal: Negative.    Skin: Negative.    Allergic/Immunologic: Negative.    Neurological: Negative.    Hematologic/Lymphatic: Negative.    Psychiatric/Behavioral: Negative.          Objective:   The physical exam was conducted virtually.  Physical Exam   Constitutional: She is oriented to person, place, and time.   HENT:   Nose: Congestion present.   Eyes: Conjunctivae are normal. Pupils are equal, round, and reactive to light. Extraocular movement intact   Neck: Neck supple.   Pulmonary/Chest: Effort normal.   Abdominal: Normal appearance.   Musculoskeletal: Normal range of motion.         General: Normal range of motion.   Neurological: no focal  deficit. She is alert, oriented to person, place, and time and at baseline.   Skin: Skin is warm.   Psychiatric: Her behavior is normal. Mood, judgment and thought content normal.       Assessment:     1. Bacterial upper respiratory infection    2. Sore throat        Plan:       Bacterial upper respiratory infection  -     amoxicillin (AMOXIL) 875 MG tablet; Take 1 tablet (875 mg total) by mouth every 12 (twelve) hours. for 10 days  Dispense: 20 tablet; Refill: 0    Sore throat

## 2023-10-11 ENCOUNTER — OFFICE VISIT (OUTPATIENT)
Dept: FAMILY MEDICINE | Facility: HOSPITAL | Age: 48
End: 2023-10-11
Payer: MEDICAID

## 2023-10-11 VITALS
WEIGHT: 215.63 LBS | DIASTOLIC BLOOD PRESSURE: 86 MMHG | BODY MASS INDEX: 32.68 KG/M2 | SYSTOLIC BLOOD PRESSURE: 134 MMHG | HEART RATE: 109 BPM | HEIGHT: 68 IN

## 2023-10-11 DIAGNOSIS — M25.562 ACUTE PAIN OF LEFT KNEE: Primary | ICD-10-CM

## 2023-10-11 DIAGNOSIS — R25.2 FOOT CRAMPS: ICD-10-CM

## 2023-10-11 PROCEDURE — 99213 OFFICE O/P EST LOW 20 MIN: CPT

## 2023-10-11 RX ORDER — CELECOXIB 100 MG/1
100 CAPSULE ORAL DAILY PRN
Qty: 30 CAPSULE | Refills: 0 | Status: SHIPPED | OUTPATIENT
Start: 2023-10-11

## 2023-10-11 NOTE — PROGRESS NOTES
"  \[facility]\"" FAMILY PRACTICE CLINIC NOTE  Follow-up Visit      SUBJECTIVE:     Patient: Pilar Lester is a 48 y.o. female.    Chief Compliant:   Chief Complaint   Patient presents with    Knee Pain     LEFT       History of Present Illness:  47 yo female w/ history of lumbar osteoarthritis and left knee pain likely secondary to bursitis presenting today for continued left knee pain.    #Left knee pain  - Patient previously tolerated CSI of the left knee 8/7 with relief briefly for 3-4 days.  - Has made one appointment with physical therapy with continued home exercises/stretches.  - Previously tried Mobic with minimal relief.    #Right toe cramping  - Patient reporting flexion and intermittent cramping of 2nd and 3rd toe mainly at night x2 months.  - Initially appeared to be manageable, but has progressively kept patient up at night.  - Has tried Voltaren gel and flexeril with minimal relief.       Review of Systems   Constitutional:  Negative for chills and fever.   HENT:  Negative for hearing loss.    Eyes:  Negative for blurred vision and double vision.   Respiratory:  Negative for cough.    Cardiovascular:  Negative for chest pain.   Musculoskeletal:  Positive for joint pain.   Skin:  Negative for rash.   Neurological:  Negative for weakness and headaches.     A 10+ review of systems was performed with pertinent positives and negatives noted above in the history of present illness. Other systems were negative unless otherwise specified.    OBJECTIVE:     Vital Signs (Most Recent)  Vitals:    10/11/23 1316   BP: 134/86   Pulse: 109   Weight: 97.8 kg (215 lb 9.8 oz)   Height: 5' 8" (1.727 m)     BMI: Body mass index is 32.78 kg/m².     Physical Exam:  Physical Exam  Constitutional:       Appearance: Normal appearance.   HENT:      Head: Normocephalic and atraumatic.      Right Ear: Tympanic membrane normal.      Left Ear: Tympanic membrane normal.      Mouth/Throat:      Mouth: Mucous membranes are moist.      Pharynx: " Oropharynx is clear.   Eyes:      Extraocular Movements: Extraocular movements intact.      Conjunctiva/sclera: Conjunctivae normal.      Pupils: Pupils are equal, round, and reactive to light.   Cardiovascular:      Rate and Rhythm: Normal rate and regular rhythm.      Pulses: Normal pulses.      Heart sounds: Normal heart sounds.   Pulmonary:      Effort: Pulmonary effort is normal.      Breath sounds: Normal breath sounds.   Musculoskeletal:      Cervical back: Normal range of motion.      Comments: Swelling of the left knee medially, negative anterior drawer, negative posterior drawer, negative lachman's.    2nd and 3rd right lower extremity digits intermittently in flexion.    Neurological:      Mental Status: She is alert.        ASSESSMENT:   Pilar Lester is a 48 y.o. female who presents to clinic for left knee pain.    1. Acute pain of left knee    2. Foot cramps         PLAN:     Acute pain of left knee  - Patient presented the option of another CSI vs trial of another pharmacologic agent. She appeared to be amenable to taking another anti-inflammatory agent; she preferred a CSI the next visit if there is no relief with the medication. Originally tried Mobic with minimal relief. Orders for celecoxib placed. Exercise band instructions given for knee pain. RTC in 4 weeks for follow up.  -     celecoxib (CELEBREX) 100 MG capsule; Take 1 capsule (100 mg total) by mouth daily as needed for Pain.  Dispense: 30 capsule; Refill: 0    Foot cramps  - Pain appearing biomechanical in nature, less likely dehydration or electrolyte abnormalities. Referral for podiatry placed for orthotics.   -     Ambulatory referral/consult to Podiatry; Future; Expected date: 10/18/2023        Provided patient with anticipatory guidance and return precautions. Treatment plan discussed with patient, all questions answered, and patient acknowledged understanding and verbal agreement.      Follow-up in: 4 weeks; or sooner PRN if acute  concerns arise.      Case discussed with Dr. ROMELIA Bell Do, MD  [facility] PGY-1

## 2023-10-12 NOTE — PROGRESS NOTES
I assume primary medical responsibility for this patient. I have reviewed the history, physical, and assessment & treatment plan with the resident and agree that the care is reasonable and necessary. This service has been performed by a resident with the presence of a teaching physician under the primary care exception. If necessary, an addendum of additional findings or evaluation beyond the resident documentation will be noted below.        Hardeep Aj Jr., DO    [facility]

## 2023-11-09 ENCOUNTER — OFFICE VISIT (OUTPATIENT)
Dept: PODIATRY | Facility: CLINIC | Age: 48
End: 2023-11-09
Payer: MEDICAID

## 2023-11-09 ENCOUNTER — TELEPHONE (OUTPATIENT)
Dept: PODIATRY | Facility: CLINIC | Age: 48
End: 2023-11-09
Payer: MEDICAID

## 2023-11-09 VITALS — BODY MASS INDEX: 32.68 KG/M2 | WEIGHT: 215.63 LBS | HEIGHT: 68 IN

## 2023-11-09 DIAGNOSIS — R25.2 FOOT CRAMPS: ICD-10-CM

## 2023-11-09 DIAGNOSIS — R20.2 PARESTHESIA OF RIGHT FOOT: Primary | ICD-10-CM

## 2023-11-09 DIAGNOSIS — M62.838 MUSCLE SPASM: ICD-10-CM

## 2023-11-09 DIAGNOSIS — M79.672 BILATERAL FOOT PAIN: Primary | ICD-10-CM

## 2023-11-09 DIAGNOSIS — M79.671 BILATERAL FOOT PAIN: Primary | ICD-10-CM

## 2023-11-09 PROCEDURE — 1159F MED LIST DOCD IN RCRD: CPT | Mod: CPTII,,, | Performed by: PODIATRIST

## 2023-11-09 PROCEDURE — 99999 PR PBB SHADOW E&M-EST. PATIENT-LVL III: CPT | Mod: PBBFAC,,, | Performed by: PODIATRIST

## 2023-11-09 PROCEDURE — 99203 OFFICE O/P NEW LOW 30 MIN: CPT | Mod: S$PBB,,, | Performed by: PODIATRIST

## 2023-11-09 PROCEDURE — 1159F PR MEDICATION LIST DOCUMENTED IN MEDICAL RECORD: ICD-10-PCS | Mod: CPTII,,, | Performed by: PODIATRIST

## 2023-11-09 PROCEDURE — 1160F RVW MEDS BY RX/DR IN RCRD: CPT | Mod: CPTII,,, | Performed by: PODIATRIST

## 2023-11-09 PROCEDURE — 1160F PR REVIEW ALL MEDS BY PRESCRIBER/CLIN PHARMACIST DOCUMENTED: ICD-10-PCS | Mod: CPTII,,, | Performed by: PODIATRIST

## 2023-11-09 PROCEDURE — 3008F PR BODY MASS INDEX (BMI) DOCUMENTED: ICD-10-PCS | Mod: CPTII,,, | Performed by: PODIATRIST

## 2023-11-09 PROCEDURE — 3008F BODY MASS INDEX DOCD: CPT | Mod: CPTII,,, | Performed by: PODIATRIST

## 2023-11-09 PROCEDURE — 3044F HG A1C LEVEL LT 7.0%: CPT | Mod: CPTII,,, | Performed by: PODIATRIST

## 2023-11-09 PROCEDURE — 99213 OFFICE O/P EST LOW 20 MIN: CPT | Mod: PBBFAC,PN | Performed by: PODIATRIST

## 2023-11-09 PROCEDURE — 99203 PR OFFICE/OUTPT VISIT, NEW, LEVL III, 30-44 MIN: ICD-10-PCS | Mod: S$PBB,,, | Performed by: PODIATRIST

## 2023-11-09 PROCEDURE — 99999 PR PBB SHADOW E&M-EST. PATIENT-LVL III: ICD-10-PCS | Mod: PBBFAC,,, | Performed by: PODIATRIST

## 2023-11-09 PROCEDURE — 3044F PR MOST RECENT HEMOGLOBIN A1C LEVEL <7.0%: ICD-10-PCS | Mod: CPTII,,, | Performed by: PODIATRIST

## 2023-11-09 RX ORDER — CAPSAICIN 0 G/G
1 CREAM TOPICAL 2 TIMES DAILY
Qty: 56.6 G | Refills: 1 | Status: SHIPPED | OUTPATIENT
Start: 2023-11-09 | End: 2023-12-09

## 2023-12-08 ENCOUNTER — OFFICE VISIT (OUTPATIENT)
Dept: FAMILY MEDICINE | Facility: HOSPITAL | Age: 48
End: 2023-12-08
Payer: MEDICAID

## 2023-12-08 VITALS
BODY MASS INDEX: 34.08 KG/M2 | DIASTOLIC BLOOD PRESSURE: 82 MMHG | WEIGHT: 224.88 LBS | HEART RATE: 101 BPM | SYSTOLIC BLOOD PRESSURE: 129 MMHG | HEIGHT: 68 IN

## 2023-12-08 DIAGNOSIS — M25.562 ACUTE PAIN OF LEFT KNEE: Primary | ICD-10-CM

## 2023-12-08 DIAGNOSIS — R25.2 FOOT CRAMPS: ICD-10-CM

## 2023-12-08 PROCEDURE — 99213 OFFICE O/P EST LOW 20 MIN: CPT

## 2023-12-08 RX ORDER — PHENTERMINE HYDROCHLORIDE 37.5 MG/1
18.75 TABLET ORAL 2 TIMES DAILY
COMMUNITY
Start: 2023-11-02

## 2023-12-08 NOTE — PROGRESS NOTES
[facility] FAMILY PRACTICE CLINIC NOTE  Follow-up Visit      SUBJECTIVE:     Patient: Pilar Lester is a 48 y.o. female.    Chief Compliant:   Chief Complaint   Patient presents with    Knee Pain     LEFT    Flu Vaccine       History of Present Illness:  47 yo female w/ history of lumbar osteoarthritis and left knee pain likely secondary to bursitis presenting today for continued left knee pain.     #Left knee pain  - Patient previously tolerated CSI of the left knee 8/7 with relief briefly for 3-4 days.  - Has not been following with physical therapy due to time constraints and conflicts with job (works at )   - Has tried exercise band instructions from last visit with minimal relief.   - Has tried rest, ice, compression,  - Prescribed celecoxib since last visit, however, denies any moderate relief with medication.  - Wants another CSI today.      #Right toe cramping  - Patient to follow with podiatry regarding right foot pain.  - No acute complaints today regarding this.       Review of Systems   Constitutional:  Negative for chills, diaphoresis and fever.   HENT:  Negative for congestion and sore throat.    Eyes:  Negative for blurred vision and pain.   Respiratory:  Negative for cough and shortness of breath.    Cardiovascular:  Negative for chest pain and palpitations.   Gastrointestinal:  Negative for abdominal pain, constipation, diarrhea and nausea.   Genitourinary:  Negative for dysuria and urgency.   Musculoskeletal:  Positive for joint pain. Negative for back pain and myalgias.   Neurological:  Negative for dizziness, weakness and headaches.   Psychiatric/Behavioral:  Negative for depression. The patient is not nervous/anxious.      A 10+ review of systems was performed with pertinent positives and negatives noted above in the history of present illness. Other systems were negative unless otherwise specified.    OBJECTIVE:     Vital Signs (Most Recent)  Vitals:    12/08/23 0906   BP: 129/82   Pulse:  "101   Weight: 102 kg (224 lb 13.9 oz)   Height: 5' 8" (1.727 m)     BMI: Body mass index is 34.19 kg/m².     Physical Exam:  Physical Exam  Constitutional:       Appearance: Normal appearance. She is normal weight.   HENT:      Head: Normocephalic and atraumatic.      Right Ear: Tympanic membrane normal.      Left Ear: Tympanic membrane normal.      Mouth/Throat:      Mouth: Mucous membranes are moist.      Pharynx: Oropharynx is clear.   Eyes:      Extraocular Movements: Extraocular movements intact.      Conjunctiva/sclera: Conjunctivae normal.      Pupils: Pupils are equal, round, and reactive to light.   Cardiovascular:      Rate and Rhythm: Normal rate and regular rhythm.      Pulses: Normal pulses.      Heart sounds: Normal heart sounds.   Pulmonary:      Effort: Pulmonary effort is normal.      Breath sounds: Normal breath sounds.   Abdominal:      General: Abdomen is flat.      Palpations: Abdomen is soft.   Musculoskeletal:         General: Normal range of motion.      Cervical back: Normal range of motion and neck supple.      Comments: Range of motion grossly intact to LLE. Negative anterior drawer, negative posterior drawer, negative lachman's.     Skin:     General: Skin is warm and dry.      Capillary Refill: Capillary refill takes less than 2 seconds.   Neurological:      General: No focal deficit present.      Mental Status: She is alert and oriented to person, place, and time.        Procedure Note    Patient Name: Pilar Lester  Date of Procedure: December 08, 2023  Time of Procedure: 10:10AM  Procedure: Corticosteroid Injection (CSI) into the left knee    Indications: The patient was diagnosed with acute left knee pain and agreed to the left knee corticosteroid injection after conservative management options were exhausted. The risks, benefits, and alternatives were discussed with the patient, who gave informed consent for the procedure.    Procedure in Detail:  1. The left knee was identified and " marked.  2. The skin was prepped with betadine, followed by ethyl chloride spray for local anesthesia.  3. A 22-gauge needle was inserted into the intra-articular space of the right knee under aseptic technique.  4. A mixture of 1cc of Kenalog, 2cc of 1% lidocaine, and 2cc of 0.5% bupivacaine was then slowly injected without difficulty or complication.  5. The needle was withdrawn and the injection site was cleaned and covered with a sterile dressing.  6. The patient tolerated the procedure well.    Post-procedure Plan:  The patient was advised to rest and avoid strenuous activity for the next 24-48 hours. Ice application was recommended for the next 24 hours to minimize swelling. Pain relief should be expected within 24-48 hours. The patient was advised to report any signs of infection, such as redness, swelling, or increased pain at the injection site.    Ray Stovall MD  [facility] Family Medicine PGY-1   Date: December 08, 2023    ASSESSMENT:   Pilar Lester is a 48 y.o. female who presents to clinic to left knee pain.    1. Acute pain of left knee [M25.562]    2. Foot cramps         PLAN:     Acute pain of left knee [M25.562]  -     Ambulatory referral/consult to Physical/Occupational Therapy; Future; Expected date: 12/16/2023  -  Patient presents with continued left knee pain for x4 months. Initial imaging notable for minor DJD patellofemoral joint. CSI was initially done with initial improvement, however, continued to have pain despite conservative measures with at home exercises/stretches and NSAIDS. Patient requested CSI during this visit and was done without complication. Patient instructed to follow up with physical therapy      Provided patient with anticipatory guidance and return precautions. Treatment plan discussed with patient, all questions answered, and patient acknowledged understanding and verbal agreement.      Follow-up in: PRN if acute concerns arise.      Case discussed with Dr. Govind Bell  MD Sia  [facility] Family Medicine PGY-1

## 2023-12-11 NOTE — PROGRESS NOTES
I assume primary medical responsibility for this patient. I have reviewed the history, physical, and assessement & treatment plan with the resident and agree that the care is reasonable and necessary. This service has been performed by a resident with the presence of a teaching physician under the primary care exception. If necessary, an addendum of additional findings or evaluation beyond the resident documentation will be noted below.

## 2024-01-02 ENCOUNTER — PROCEDURE VISIT (OUTPATIENT)
Dept: NEUROLOGY | Facility: CLINIC | Age: 49
End: 2024-01-02
Payer: MEDICAID

## 2024-01-02 DIAGNOSIS — R25.2 FOOT CRAMPS: ICD-10-CM

## 2024-01-02 DIAGNOSIS — R20.2 PARESTHESIA OF RIGHT FOOT: ICD-10-CM

## 2024-01-02 PROCEDURE — 95886 MUSC TEST DONE W/N TEST COMP: CPT | Mod: 26,S$PBB,, | Performed by: PHYSICAL MEDICINE & REHABILITATION

## 2024-01-02 PROCEDURE — 95911 NRV CNDJ TEST 9-10 STUDIES: CPT | Mod: 26,S$PBB,, | Performed by: PHYSICAL MEDICINE & REHABILITATION

## 2024-01-02 PROCEDURE — 95911 NRV CNDJ TEST 9-10 STUDIES: CPT | Mod: PBBFAC | Performed by: PHYSICAL MEDICINE & REHABILITATION

## 2024-01-02 PROCEDURE — 95886 MUSC TEST DONE W/N TEST COMP: CPT | Mod: PBBFAC | Performed by: PHYSICAL MEDICINE & REHABILITATION

## 2024-01-02 NOTE — PROCEDURES
Test Date:  2024    Patient: liliana lester : 1975 Physician: Edmund Knight D.O.   ID#: [id number] SEX: Female Ref. Phys: Quoc Austin Jr., *     HPI: Liliana Lester is a 48 y.o.female who presents for NCS/EMG to evaluate right foot cramping and paresthesias, evaluate for tarsal tunnel syndrome.      NCV & EMG Findings:  All nerve conduction studies (as indicated in the following tables) were within normal limits.  All examined muscles (as indicated in the following table) showed no evidence of electrical instability.    Impression:  This is a normal electrophysiologic study of the right lower extremity.        ___________________________  Edmund Knight D.O.        NCS+  Motor Nerve Results      Latency Amplitude F-Lat Segment Distance CV Comment   Site (ms) Norm (mV) Norm (ms)  (cm) (m/s) Norm    Left Fibular (EDB)   Ankle 3.8  < 6.5 6.1  > 1.10         Bel Fib Head 13.1 - 5.0 -  Bel Fib Head-Ankle 41 44  > 36    Pop Fossa 14.4 - 3.8 -  Pop Fossa-Bel Fib Head 9 69  > 42    Right Fibular (EDB)   Ankle 4.1  < 6.5 3.5  > 1.10         Bel Fib Head 13.0 - 3.0 -  Bel Fib Head-Ankle 38 43  > 36    Pop Fossa 14.9 - 2.1 -  Pop Fossa-Bel Fib Head 9 47  > 42    Left Tibial (AHB)   Ankle 4.1  < 6.1 5.7  > 5.3         Right Tibial (AHB)   Ankle 3.2  < 6.1 5.5  > 5.3           Sensory Nerve Results      Latency (Peak) Amplitude (P-P) Segment Distance CV Comment   Site (ms) Norm (µV) Norm  (cm) (m/s) Norm    Left Sural   Calf-Lat Mall 3.2  < 4.5 33  > 4 Calf-Lat Mall 14 44  > 35    Right Sural   Calf-Lat Mall 3.5  < 4.5 16  > 4 Calf-Lat Mall 14 40  > 35    Left Superficial Fibular   14 cm-Ankle 3.4  < 4.2 14  > 5 14 cm-Ankle 14 41  > 32    Right Superficial Fibular   14 cm-Ankle 3.4  < 4.2 9  > 5 14 cm-Ankle 14 41  > 32    Right Medial Plantar   Med Sole-Med Mall 3.0  < 3.7 5 - Med Sole-Med Mall - - -      EMG+     Side Muscle Nerve Root Ins Act Fibs Psw Amp Dur Poly Recrt Int Pat Comment   Right Vastus  Med Femoral L2-L4 Nml Nml Nml Nml Nml 0 Nml Nml    Right Tib Anterior Fibular,  Deep Fibula... L4-L5 Nml Nml Nml Nml Nml 0 Nml Nml    Right Fib Longus Fibular,  Superficial... L5-S1 Nml Nml Nml Nml Nml 0 Nml Nml    Right Gastroc Tibial S1-S2 Nml Nml Nml Nml Nml 0 Nml Nml    Right AHB Tibial,  Medial Plant... S1-S2 Nml Nml Nml Nml Nml 0 Nml Nml            Waveforms:    Motor              Sensory

## 2024-03-13 ENCOUNTER — OFFICE VISIT (OUTPATIENT)
Dept: FAMILY MEDICINE | Facility: HOSPITAL | Age: 49
End: 2024-03-13
Payer: MEDICAID

## 2024-03-13 VITALS
HEART RATE: 83 BPM | SYSTOLIC BLOOD PRESSURE: 119 MMHG | DIASTOLIC BLOOD PRESSURE: 84 MMHG | WEIGHT: 234.13 LBS | HEIGHT: 68 IN | BODY MASS INDEX: 35.48 KG/M2

## 2024-03-13 DIAGNOSIS — R10.2 SUPRAPUBIC PAIN, ACUTE: ICD-10-CM

## 2024-03-13 DIAGNOSIS — M47.26 OSTEOARTHRITIS OF SPINE WITH RADICULOPATHY, LUMBAR REGION: Primary | ICD-10-CM

## 2024-03-13 DIAGNOSIS — G43.109 MIGRAINE WITH AURA AND WITHOUT STATUS MIGRAINOSUS, NOT INTRACTABLE: ICD-10-CM

## 2024-03-13 DIAGNOSIS — N93.9 VAGINAL SPOTTING: ICD-10-CM

## 2024-03-13 DIAGNOSIS — N95.1 PERIMENOPAUSAL: ICD-10-CM

## 2024-03-13 PROCEDURE — 99215 OFFICE O/P EST HI 40 MIN: CPT

## 2024-03-13 RX ORDER — CYCLOBENZAPRINE HCL 10 MG
10 TABLET ORAL 3 TIMES DAILY PRN
Qty: 20 TABLET | Refills: 0 | Status: SHIPPED | OUTPATIENT
Start: 2024-03-13 | End: 2024-03-23

## 2024-03-13 NOTE — PROGRESS NOTES
History & Physical  [facility] FAMILY PRACTICE      SUBJECTIVE:     History of Present Illness:  Patient is a 48 y.o. female with history of lumbar osteoarthritis presents to clinic for lower back pain and suprapubic pain.    Lower back pain, vaginal spotting  Patient presents with lumbar back pain which she describes as cramping which radiates to suprapubic region which she reports beginning 3/5.  Pain has affected her daily life she is having increased pain when bending over.  The same day she also noted to notice vaginal spotting which was light pink which lasted 2 days.  Patient lena-menopausal, reports last menstrual cycle 11/2023. Periods normal and consistent prior.  She reports feeling nauseous and lightheaded while in shower 3 days ago.  She reports this found on transvaginal ultrasound years ago she is concerned that there may be increasing size of cyst.  She has had physical therapy in the past for her knee as well as being prescribed Flexeril which she states has only helped temporarily.  Patient denies dysuria and other urinary symptoms  Denies N/V/D.      ROS  A 10+ review of systems was performed with pertinent positives and negatives noted above in the history of present illness.  Other systems were negative unless otherwise specified.    Review of patient's allergies indicates:   Allergen Reactions    Trazodone Swelling       Past Medical History:   Diagnosis Date    Asthma        Current Outpatient Medications   Medication Instructions    albuterol (ACCUNEB) 0.63 mg/3 mL Nebu albuterol sulfate Take No date recorded No form recorded No frequency recorded No route recorded No set duration recorded No set duration amount recorded active No dosage strength recorded No dosage strength units of measure recorded    celecoxib (CELEBREX) 100 mg, Oral, Daily PRN    cyclobenzaprine (FLEXERIL) 10 mg, Oral, 3 times daily PRN    gabapentin (NEURONTIN) 100 MG capsule gabapentin Take No date recorded No form recorded  "No frequency recorded No route recorded No set duration recorded No set duration amount recorded active No dosage strength recorded No dosage strength units of measure recorded    phentermine (ADIPEX-P) 18.75 mg, Oral, 2 times daily       Past Surgical History:   Procedure Laterality Date    CHOLECYSTECTOMY      ESOPHAGOGASTRODUODENOSCOPY      ESOPHAGOGASTRODUODENOSCOPY N/A 12/18/2020    Procedure: EGD (ESOPHAGOGASTRODUODENOSCOPY);  Surgeon: Markel Duff MD;  Location: [facility];  Service: Endoscopy;  Laterality: N/A;    TUBAL LIGATION         Family History   Problem Relation Age of Onset    Breast cancer Mother 50       Social History     Tobacco Use    Smoking status: Never    Smokeless tobacco: Never   Substance Use Topics    Alcohol use: Never    Drug use: Never        OBJECTIVE:     Vital Signs (Most Recent)  Vitals:    03/13/24 0852 03/13/24 0856   BP: (!) 71/53 119/84   Pulse: 93 83   Weight: 106.2 kg (234 lb 2.1 oz)    Height: 5' 8" (1.727 m)      BMI: Body mass index is 35.6 kg/m².    Physical Exam:  Physical Exam  Constitutional:       General: She is not in acute distress.     Appearance: Normal appearance. She is normal weight. She is not ill-appearing, toxic-appearing or diaphoretic.   HENT:      Head: Normocephalic and atraumatic.      Nose: Nose normal.      Mouth/Throat:      Mouth: Mucous membranes are moist.   Eyes:      Pupils: Pupils are equal, round, and reactive to light.   Cardiovascular:      Rate and Rhythm: Normal rate and regular rhythm.      Pulses: Normal pulses.      Heart sounds: Normal heart sounds.   Pulmonary:      Effort: Pulmonary effort is normal. No respiratory distress.      Breath sounds: No wheezing or rales.   Abdominal:      General: Abdomen is flat. Bowel sounds are normal. There is no distension.      Palpations: Abdomen is soft. There is no mass.      Tenderness: There is abdominal tenderness (suprapubic). There is no guarding or rebound.      Hernia: No hernia is " present.   Musculoskeletal:         General: No swelling, tenderness, deformity or signs of injury. Normal range of motion.      Right lower leg: No edema.      Left lower leg: No edema.   Skin:     General: Skin is warm.      Findings: No bruising, erythema, lesion or rash.   Neurological:      General: No focal deficit present.      Mental Status: She is alert and oriented to person, place, and time.   Psychiatric:         Mood and Affect: Mood normal.         Behavior: Behavior normal.         Thought Content: Thought content normal.         Judgment: Judgment normal.       Labs  Hemoglobin A1C   Date Value Ref Range Status   05/12/2023 5.0 4.0 - 5.6 % Final     Comment:     ADA Screening Guidelines:  5.7-6.4%  Consistent with prediabetes  >or=6.5%  Consistent with diabetes    High levels of fetal hemoglobin interfere with the HbA1C  assay. Heterozygous hemoglobin variants (HbS, HgC, etc)do  not significantly interfere with this assay.   However, presence of multiple variants may affect accuracy.     12/02/2021 5.3 4.0 - 5.6 % Final     Comment:     ADA Screening Guidelines:  5.7-6.4%  Consistent with prediabetes  >or=6.5%  Consistent with diabetes    High levels of fetal hemoglobin interfere with the HbA1C  assay. Heterozygous hemoglobin variants (HbS, HgC, etc)do  not significantly interfere with this assay.   However, presence of multiple variants may affect accuracy.       TSH   Date Value Ref Range Status   12/02/2021 1.673 0.400 - 4.000 uIU/mL Final        ASSESSMENT/PLAN:   48 y.o.female presents to clinic for lumbar back pain with radiation to suprapubic area. High supsicion pain is MSK in nature, given history of ovarian cyst will obtain transvaginal ultrasound to ensure since have not change significantly.        Osteoarthritis of spine with radiculopathy, lumbar region  -     cyclobenzaprine (FLEXERIL) 10 MG tablet; Take 1 tablet (10 mg total) by mouth 3 (three) times daily as needed for Muscle spasms.   Dispense: 20 tablet; Refill: 0  -     Ambulatory referral/consult to Physical/Occupational Therapy; Future; Expected date: 03/20/2024    Suprapubic pain, acute  -     Urinalysis; Future; Expected date: 03/13/2024  -     US Transvaginal Non OB; Future; Expected date: 03/13/2024    Perimenopausal  -     US Transvaginal Non OB; Future; Expected date: 03/13/2024    Vaginal spotting  -     Urinalysis; Future; Expected date: 03/13/2024  -     US Transvaginal Non OB; Future; Expected date: 03/13/2024   - Given ED precautions including significant increase in bleeding and increase in suprapubic pain.      Provided patient with anticipatory guidance and return precautions. Treatment plan discussed with patient, all questions answered, and patient acknowledged understanding and verbal agreement.      Follow-up in: 2 months. Or sooner PRN as acute concerns arise.     Medical decision making straight forward and not complex during this visit.       Case discussed with Dr. ROMELIA Cross.    ________________________  Chiquis Cowan M.D.  [facility] Family Medicine PGY-1  3/13/2024  10:10 AM      *This note was partially created using [employer] Voice Recognition software. Typographical and content errors may occur with this process. While efforts are made to detect and correct such errors, in some cases errors will persist. For this reason, wording in this document should be considered in the proper context and not strictly verbatim.

## 2024-03-18 ENCOUNTER — PATIENT MESSAGE (OUTPATIENT)
Dept: FAMILY MEDICINE | Facility: HOSPITAL | Age: 49
End: 2024-03-18
Payer: MEDICAID

## 2024-03-18 ENCOUNTER — HOSPITAL ENCOUNTER (OUTPATIENT)
Dept: RADIOLOGY | Facility: HOSPITAL | Age: 49
Discharge: HOME | End: 2024-03-18
Payer: MEDICAID

## 2024-03-18 DIAGNOSIS — N95.1 PERIMENOPAUSAL: ICD-10-CM

## 2024-03-18 DIAGNOSIS — R10.2 SUPRAPUBIC PAIN, ACUTE: ICD-10-CM

## 2024-03-18 DIAGNOSIS — N93.9 VAGINAL SPOTTING: ICD-10-CM

## 2024-03-18 PROCEDURE — 76830 TRANSVAGINAL US NON-OB: CPT | Mod: TC

## 2024-03-18 PROCEDURE — 76830 TRANSVAGINAL US NON-OB: CPT | Mod: 26,,, | Performed by: RADIOLOGY

## 2024-03-18 PROCEDURE — 76856 US EXAM PELVIC COMPLETE: CPT | Mod: 26,,, | Performed by: RADIOLOGY

## 2024-03-20 ENCOUNTER — PATIENT MESSAGE (OUTPATIENT)
Dept: FAMILY MEDICINE | Facility: HOSPITAL | Age: 49
End: 2024-03-20
Payer: MEDICAID

## 2024-03-20 DIAGNOSIS — R93.89 ABNORMAL VAGINAL BLEEDING WITH ENDOMETRIAL THICKNESS GREATER THAN 5 MM PRESENT ON TRANSVAGINAL ULTRASOUND IN POSTMENOPAUSAL PATIENT: Primary | ICD-10-CM

## 2024-03-20 DIAGNOSIS — N83.201 CYST OF RIGHT OVARY: ICD-10-CM

## 2024-03-20 DIAGNOSIS — N95.0 ABNORMAL VAGINAL BLEEDING WITH ENDOMETRIAL THICKNESS GREATER THAN 5 MM PRESENT ON TRANSVAGINAL ULTRASOUND IN POSTMENOPAUSAL PATIENT: Primary | ICD-10-CM

## 2024-03-20 DIAGNOSIS — D25.9 UTERINE LEIOMYOMA, UNSPECIFIED LOCATION: ICD-10-CM

## 2024-03-24 ENCOUNTER — ON-DEMAND VIRTUAL (OUTPATIENT)
Dept: URGENT CARE | Facility: CLINIC | Age: 49
End: 2024-03-24
Payer: MEDICAID

## 2024-03-24 DIAGNOSIS — J01.90 ACUTE NON-RECURRENT SINUSITIS, UNSPECIFIED LOCATION: ICD-10-CM

## 2024-03-24 DIAGNOSIS — R05.9 COUGH, UNSPECIFIED TYPE: ICD-10-CM

## 2024-03-24 DIAGNOSIS — J20.9 ACUTE BRONCHITIS DUE TO INFECTION: Primary | ICD-10-CM

## 2024-03-24 PROCEDURE — 99213 OFFICE O/P EST LOW 20 MIN: CPT | Mod: 95,,, | Performed by: FAMILY MEDICINE

## 2024-03-24 RX ORDER — DOXYCYCLINE 100 MG/1
100 CAPSULE ORAL 2 TIMES DAILY
Qty: 14 CAPSULE | Refills: 0 | Status: SHIPPED | OUTPATIENT
Start: 2024-03-24 | End: 2024-03-31

## 2024-03-24 NOTE — PROGRESS NOTES
Subjective:      Patient ID: Pilar Lester is a 48 y.o. female.    Vitals:  vitals were not taken for this visit.     Chief Complaint: No chief complaint on file.      Visit Type: TELE AUDIOVISUAL    Present with the patient at the time of consultation: TELEMED PRESENT WITH PATIENT: None    Past Medical History:   Diagnosis Date    Asthma      Past Surgical History:   Procedure Laterality Date    CHOLECYSTECTOMY      ESOPHAGOGASTRODUODENOSCOPY      ESOPHAGOGASTRODUODENOSCOPY N/A 2020    Procedure: EGD (ESOPHAGOGASTRODUODENOSCOPY);  Surgeon: Markel Duff MD;  Location: [facility];  Service: Endoscopy;  Laterality: N/A;    TUBAL LIGATION       Review of patient's allergies indicates:   Allergen Reactions    Trazodone Swelling     Current Outpatient Medications on File Prior to Visit   Medication Sig Dispense Refill    albuterol (ACCUNEB) 0.63 mg/3 mL Nebu albuterol sulfate Take No date recorded No form recorded No frequency recorded No route recorded No set duration recorded No set duration amount recorded active No dosage strength recorded No dosage strength units of measure recorded      celecoxib (CELEBREX) 100 MG capsule Take 1 capsule (100 mg total) by mouth daily as needed for Pain. (Patient not taking: Reported on 2023) 30 capsule 0    [] cyclobenzaprine (FLEXERIL) 10 MG tablet Take 1 tablet (10 mg total) by mouth 3 (three) times daily as needed for Muscle spasms. 20 tablet 0    gabapentin (NEURONTIN) 100 MG capsule gabapentin Take No date recorded No form recorded No frequency recorded No route recorded No set duration recorded No set duration amount recorded active No dosage strength recorded No dosage strength units of measure recorded      phentermine (ADIPEX-P) 37.5 mg tablet Take 18.75 mg by mouth 2 (two) times daily.       No current facility-administered medications on file prior to visit.     Family History   Problem Relation Age of Onset    Breast cancer Mother 50        Medications Ordered                [facility] [id number] [address]   [address] [place] [address]    Telephone: [phone]   Fax: [phone]   Hours: Not open 24 hours                         E-Prescribed (1 of 1)              doxycycline (VIBRAMYCIN) 100 MG Cap    Sig: Take 1 capsule (100 mg total) by mouth 2 (two) times daily. for 7 days       Start: 3/24/24     Quantity: 14 capsule Refills: 0                           Ohs Peq Odvv Intake    3/24/2024  5:54 PM CDT - Filed by Patient   What is your current physical address in the event of a medical emergency? [address] [place] [address]   Are you able to take your vital signs? No   Please attach any relevant images or files          HPI Patient presents with c/o cough, sore throat, running nose, sinus and chest congestion for 10 days, symptoms getting worse over last 2 days with green phlegm. Pt denies fever, chills, CP, SOB, weakness/dizziness, N/V, diarrhea, abdominal pain, dysuria, loss of smell or taste.     ROS     Objective:   The physical exam was conducted virtually.  Physical Exam   Constitutional:  Non-toxic appearance. She does not appear ill. No distress.   HENT:   Nose: Congestion present.   Pulmonary/Chest: No stridor. No respiratory distress. She has no wheezes.   Neurological: She is at baseline.   Skin: Skin is not diaphoretic.       Assessment:     1. Acute bronchitis due to infection    2. Acute non-recurrent sinusitis, unspecified location    3. Cough, unspecified type        Plan:   Discussed exam findings/diagnosis/plan with patient. Instructions regarding the visit diagnosis and management advised. Advised to f/u with PCP within 2-5 days. ER precautions given if symptoms get any worse. All questions answered. Patient verbally understood and agreed with treatment plan.      Acute bronchitis due to infection    Acute non-recurrent sinusitis, unspecified location    Cough, unspecified type    Other orders  -      doxycycline (VIBRAMYCIN) 100 MG Cap; Take 1 capsule (100 mg total) by mouth 2 (two) times daily. for 7 days  Dispense: 14 capsule; Refill: 0

## 2024-05-02 ENCOUNTER — ON-DEMAND VIRTUAL (OUTPATIENT)
Dept: URGENT CARE | Facility: CLINIC | Age: 49
End: 2024-05-02
Payer: MEDICAID

## 2024-05-02 DIAGNOSIS — J03.90 TONSILLITIS: Primary | ICD-10-CM

## 2024-05-02 PROCEDURE — 99213 OFFICE O/P EST LOW 20 MIN: CPT | Mod: 95,,, | Performed by: FAMILY MEDICINE

## 2024-05-02 RX ORDER — CEPHALEXIN 500 MG/1
500 CAPSULE ORAL EVERY 12 HOURS
Qty: 14 CAPSULE | Refills: 0 | Status: SHIPPED | OUTPATIENT
Start: 2024-05-02 | End: 2024-05-09

## 2024-05-02 NOTE — PATIENT INSTRUCTIONS
Drink a lot of fluid, Tylenol for pain, salt water gargling, antibiotics prescribed, if symptoms getting worse in 3 to 5 days, please see provider in person,

## 2024-05-02 NOTE — PROGRESS NOTES
Subjective:      Patient ID: Pilar Lester is a 48 y.o. female.    Vitals:  vitals were not taken for this visit.     Chief Complaint: Sore Throat (1 day)      Visit Type: TELE AUDIOVISUAL    Present with the patient at the time of consultation: TELEMED PRESENT WITH PATIENT: None    Past Medical History:   Diagnosis Date    Asthma      Past Surgical History:   Procedure Laterality Date    CHOLECYSTECTOMY      ESOPHAGOGASTRODUODENOSCOPY      ESOPHAGOGASTRODUODENOSCOPY N/A 12/18/2020    Procedure: EGD (ESOPHAGOGASTRODUODENOSCOPY);  Surgeon: Markel Duff MD;  Location: [facility];  Service: Endoscopy;  Laterality: N/A;    TUBAL LIGATION       Review of patient's allergies indicates:   Allergen Reactions    Trazodone Swelling     Current Outpatient Medications on File Prior to Visit   Medication Sig Dispense Refill    gabapentin (NEURONTIN) 100 MG capsule gabapentin Take No date recorded No form recorded No frequency recorded No route recorded No set duration recorded No set duration amount recorded active No dosage strength recorded No dosage strength units of measure recorded      phentermine (ADIPEX-P) 37.5 mg tablet Take 18.75 mg by mouth 2 (two) times daily.      albuterol (ACCUNEB) 0.63 mg/3 mL Nebu albuterol sulfate Take No date recorded No form recorded No frequency recorded No route recorded No set duration recorded No set duration amount recorded active No dosage strength recorded No dosage strength units of measure recorded      celecoxib (CELEBREX) 100 MG capsule Take 1 capsule (100 mg total) by mouth daily as needed for Pain. (Patient not taking: Reported on 12/8/2023) 30 capsule 0     No current facility-administered medications on file prior to visit.     Family History   Problem Relation Name Age of Onset    Breast cancer Mother  50       Medications Ordered                [id number] - [place] - [address]   [address], [place] [address]    Telephone: [phone]   Fax: [phone]    Hours: Not open 24 hours                         E-Prescribed (1 of 1)              cephALEXin (KEFLEX) 500 MG capsule    Sig: Take 1 capsule (500 mg total) by mouth every 12 (twelve) hours. for 7 days       Start: 5/2/24     Quantity: 14 capsule Refills: 0                           Ohs Peq Odvv Intake    5/2/2024  1:17 PM CDT - Filed by Patient   What is your current physical address in the event of a medical emergency? [address] [place]   Are you able to take your vital signs? No   Please attach any relevant images or files          Sore throat green mucus and coughing, throat burning sensation ,Patient speaking in full sentences, voice is normal, no drooling        Constitution: Positive for chills. Negative for fever.   HENT:  Positive for congestion and sore throat.    Respiratory:  Positive for cough. Negative for shortness of breath and wheezing.         Objective:   The physical exam was conducted virtually.  Physical Exam   Constitutional: She is oriented to person, place, and time.   HENT:   Head: Normocephalic and atraumatic.   Mouth/Throat: Oropharyngeal exudate and posterior oropharyngeal erythema (left side tonsiel enlarged swelling and exudaiton and redness) present.   Eyes: Conjunctivae are normal.   Neck: No neck rigidity present.   Pulmonary/Chest: Effort normal. No respiratory distress.   Abdominal: Normal appearance.   Neurological: no focal deficit. She is alert and oriented to person, place, and time.   Skin: Skin is no rash.   Psychiatric: Mood, judgment and thought content normal.       Assessment:     1. Tonsillitis        Plan:       Tonsillitis  -     cephALEXin (KEFLEX) 500 MG capsule; Take 1 capsule (500 mg total) by mouth every 12 (twelve) hours. for 7 days  Dispense: 14 capsule; Refill: 0      Drink a lot of fluid, Tylenol for pain, salt water gargling, antibiotics prescribed, if symptoms getting worse in 3 to 5 days, please see provider in person,

## 2024-06-07 NOTE — PATIENT INSTRUCTIONS
Office Note     Name: Padma Younger    : 1991     MRN: [id number]     Chief Complaint  Allergies, Postpartum Care (Wants to discuss care), Depression (Phq 20 ), and Anxiety (Phq 20)    Subjective     History of Present Illness:  Padma Younger is a 33 y.o. female who presents today for \    [facility] newcare  Recently gave birth on 3/18/2024      Has dealing with depression and post-partum depression  Has noticed increase in irritability, emotional, fatigue  Feeling overwhelmed,     Recent let go.   Has a 3 yo with a different dad(he is not in the picture  Father of the baby 3month( not in the picture)      Currently on wellbutrin 150mg daily(has been taking since )      Lexapro( worked a little bit)  Citalopram   sertraline      Review of Systems:   Review of Systems   All other systems reviewed and are negative.      Past Medical History:   Past Medical History:   Diagnosis Date   • Abnormal Pap smear of cervix    • Allergic    • Anxiety    • Asthma    • Chlamydia        • Chlamydia contact, treated    • Depression    • Gallstones    • Gonorrhea        • Urinary tract infection    • Visual impairment     Kertacanis       Past Surgical History:   Past Surgical History:   Procedure Laterality Date   • CHOLECYSTECTOMY  2023   • CHOLECYSTECTOMY OPEN     • CYST REMOVAL     • D & C WITH SUCTION N/A 2017    Procedure: DILATATION AND CURETTAGE WITH SUCTION;  Surgeon: Kelley Dodge DO;  Location: [facility];  Service:    • DENTAL PROCEDURE     • DILATATION AND CURETTAGE  2017   • EYE SURGERY  2018    Intact Surgery   • KNEE SURGERY     • TUBAL ABDOMINAL LIGATION     • VAGINAL DELIVERY      x1   • WISDOM TOOTH EXTRACTION         Family History:   Family History   Problem Relation Age of Onset   • Alcohol abuse Mother    • Arthritis Mother    • COPD Mother    • Hyperlipidemia Father    • Hypertension Father    • Multiple sclerosis Sister    • Liver disease Maternal Uncle    •  Please schedule follow up with your Primary Care Provider as we discussed for this ongoing issue.   Breast cancer Maternal Grandmother    • Cancer Maternal Grandmother    • Diabetes Maternal Grandfather    • Cancer Maternal Grandfather    • Breast cancer Paternal Grandmother    • Ovarian cancer Paternal Grandmother    • Diabetes Paternal Grandmother    • Heart attack Paternal Grandmother    • Hypertension Paternal Grandmother    • Stroke Paternal Grandmother    • Cancer Paternal Grandmother    • Colon cancer Paternal Grandfather        Social History:   Social History     Socioeconomic History   • Marital status: Single   • Number of children: 1   Tobacco Use   • Smoking status: Never   • Smokeless tobacco: Never   Vaping Use   • Vaping status: Never Used   Substance and Sexual Activity   • Alcohol use: Not Currently     Alcohol/week: 2.0 standard drinks of alcohol     Types: 2 Glasses of wine per week     Comment: OCCASIONAL   • Drug use: No   • Sexual activity: Yes     Partners: Male     Birth control/protection: Condom, Abstinence       Immunizations:   Immunization History   Administered Date(s) Administered   • 31-influenza Vac Quardvalent Preservativ 11/20/2023   • ABRYSVO (RSV, 60+ or pregnant women 32-36 wks) 01/29/2024   • COVID-19 (ELLI) 03/08/2021   • COVID-19 (PFIZER) Purple Cap Monovalent 11/03/2021   • Fluzone (or Fluarix & Flulaval for VFC) >6mos 10/18/2017, 10/28/2020   • Hep B, Adolescent or Pediatric 02/23/1999   • Influenza, Unspecified 03/21/2017, 11/20/2023   • MMR 09/20/2000   • Td (TDVAX) 09/20/2000   • Tdap 02/10/2009, 01/26/2017, 03/21/2017, 01/29/2024        Medications:     Current Outpatient Medications:   •  albuterol sulfate  (90 Base) MCG/ACT inhaler, Inhale 2 puffs Every 4 (Four) Hours As Needed for Wheezing., Disp: 18 g, Rfl: 0  •  buPROPion XL (WELLBUTRIN XL) 150 MG 24 hr tablet, Take 1 tablet by mouth Daily., Disp: 30 tablet, Rfl: 5  •  cetirizine (zyrTEC) 10 MG tablet, Take 1 tablet by mouth Daily., Disp: , Rfl:   •  Cholecalciferol 25 MCG (1000 UT) tablet, Take 1  "tablet by mouth Daily., Disp: 90 tablet, Rfl: 2  •  ferrous sulfate 325 (65 FE) MG tablet, Take 1 tablet by mouth Daily With Breakfast., Disp: 30 tablet, Rfl: 1  •  ibuprofen (ADVIL,MOTRIN) 600 MG tablet, Take 1 tablet by mouth Every 6 (Six) Hours As Needed for Mild Pain (First Line: Mild pain.)., Disp: 60 tablet, Rfl: 1  •  montelukast (SINGULAIR) 10 MG tablet, Take 1 tablet by mouth Every Night., Disp: 90 tablet, Rfl: 1  •  vitamin C (ASCORBIC ACID) 250 MG tablet, Take 2 tablets by mouth Daily., Disp: 90 tablet, Rfl: 1  •  amitriptyline (ELAVIL) 25 MG tablet, Take 1 tablet by mouth Every Night., Disp: 30 tablet, Rfl: 2  •  docusate sodium 100 MG capsule, Take 1 capsule by mouth 2 (Two) Times a Day As Needed for Constipation. (Patient not taking: Reported on 6/7/2024), Disp: 60 capsule, Rfl: 1  •  furosemide (LASIX) 20 MG tablet, Take 1 tablet by mouth Daily As Needed (swelling). (Patient not taking: Reported on 6/7/2024), Disp: 5 tablet, Rfl: 0    Allergies:   Allergies   Allergen Reactions   • Oxycodone Itching       Objective     Vital Signs  /68   Pulse 100   Temp 96.8 °F (36 °C) (Temporal)   Ht 159.4 cm (62.75\")   Wt 86.8 kg (191 lb 6.4 oz)   SpO2 98%   BMI 34.18 kg/m²   Estimated body mass index is 34.18 kg/m² as calculated from the following:    Height as of this encounter: 159.4 cm (62.75\").    Weight as of this encounter: 86.8 kg (191 lb 6.4 oz).          Physical Exam  Constitutional:       Appearance: Normal appearance.   Cardiovascular:      Rate and Rhythm: Normal rate and regular rhythm.      Pulses: Normal pulses.      Heart sounds: Normal heart sounds.   Pulmonary:      Effort: Pulmonary effort is normal.      Breath sounds: Normal breath sounds.   Neurological:      Mental Status: She is alert.          Result Review :                  Assessment and Plan     1. Allergy, initial encounter    - cetirizine (zyrTEC) 10 MG tablet; Take 1 tablet by mouth Daily.    2. Postpartum anemia    - " Cholecalciferol 25 MCG (1000 UT) tablet; Take 1 tablet by mouth Daily.  Dispense: 90 tablet; Refill: 2  - ferrous sulfate 325 (65 FE) MG tablet; Take 1 tablet by mouth Daily With Breakfast.  Dispense: 30 tablet; Refill: 1  - montelukast (SINGULAIR) 10 MG tablet; Take 1 tablet by mouth Every Night.  Dispense: 90 tablet; Refill: 1  - vitamin C (ASCORBIC ACID) 250 MG tablet; Take 2 tablets by mouth Daily.  Dispense: 90 tablet; Refill: 1  - CBC No Differential; Future  - Iron and TIBC; Future  - Comprehensive metabolic panel; Future  - C-reactive protein; Future  - Lipid panel; Future  - Hemoglobin A1c; Future  - Vitamin D,25-Hydroxy; Future  - TSH Rfx On Abnormal To Free T4; Future    3. Depression, unspecified depression type    - amitriptyline (ELAVIL) 25 MG tablet; Take 1 tablet by mouth Every Night.  Dispense: 30 tablet; Refill: 2  - Comprehensive metabolic panel; Future  - C-reactive protein; Future  - Lipid panel; Future  - Hemoglobin A1c; Future  - Vitamin D,25-Hydroxy; Future  - TSH Rfx On Abnormal To Free T4; Future    4. Hidradenitis suppurativa    - Ambulatory Referral to Dermatology       Follow Up  Return in about 5 weeks (around 7/12/2024).    Gildardo Marino MD  [facility] [address]  [facility]  [address]  [facility] [address]  [facility] [address]  [phone]

## 2024-07-29 ENCOUNTER — OFFICE VISIT (OUTPATIENT)
Dept: FAMILY MEDICINE | Facility: HOSPITAL | Age: 49
End: 2024-07-29
Payer: MEDICAID

## 2024-07-29 VITALS
HEART RATE: 96 BPM | HEIGHT: 68 IN | SYSTOLIC BLOOD PRESSURE: 140 MMHG | DIASTOLIC BLOOD PRESSURE: 83 MMHG | BODY MASS INDEX: 35.95 KG/M2 | WEIGHT: 237.19 LBS

## 2024-07-29 DIAGNOSIS — R03.0 ELEVATED BP WITHOUT DIAGNOSIS OF HYPERTENSION: ICD-10-CM

## 2024-07-29 DIAGNOSIS — Z12.11 ENCOUNTER FOR SCREENING FOR MALIGNANT NEOPLASM OF COLON: ICD-10-CM

## 2024-07-29 DIAGNOSIS — G89.29 CHRONIC PAIN OF LEFT KNEE: ICD-10-CM

## 2024-07-29 DIAGNOSIS — M25.562 CHRONIC PAIN OF LEFT KNEE: ICD-10-CM

## 2024-07-29 DIAGNOSIS — N93.9 ABNORMAL UTERINE BLEEDING (AUB): Primary | ICD-10-CM

## 2024-07-29 DIAGNOSIS — Z12.31 ENCOUNTER FOR SCREENING MAMMOGRAM FOR MALIGNANT NEOPLASM OF BREAST: ICD-10-CM

## 2024-07-29 PROCEDURE — 99214 OFFICE O/P EST MOD 30 MIN: CPT

## 2024-07-31 ENCOUNTER — PATIENT MESSAGE (OUTPATIENT)
Dept: FAMILY MEDICINE | Facility: HOSPITAL | Age: 49
End: 2024-07-31
Payer: MEDICAID

## 2024-08-01 ENCOUNTER — TELEPHONE (OUTPATIENT)
Dept: FAMILY MEDICINE | Facility: HOSPITAL | Age: 49
End: 2024-08-01
Payer: MEDICAID

## 2024-08-01 NOTE — TELEPHONE ENCOUNTER
----- Message from Honey Spencer sent at 8/1/2024  3:00 PM CDT -----  Regarding: call  Type:  Needs Medical Advice    Who Called: pt  Would the patient rather a call back or a response via [facility]? Call/Portal   Best Call Back Number: [phone]  Additional Information: pt would like a call back or either a response on the portal

## 2024-08-01 NOTE — TELEPHONE ENCOUNTER
Called patient regarding phone call. Patient just wanted us to know she found a place to get her medication and pharmacy was going to have her medication transferred.

## 2024-08-03 ENCOUNTER — HOSPITAL ENCOUNTER (OUTPATIENT)
Dept: RADIOLOGY | Facility: HOSPITAL | Age: 49
Discharge: HOME | End: 2024-08-03
Payer: MEDICAID

## 2024-08-03 DIAGNOSIS — Z12.31 ENCOUNTER FOR SCREENING MAMMOGRAM FOR MALIGNANT NEOPLASM OF BREAST: ICD-10-CM

## 2024-08-03 PROCEDURE — 77067 SCR MAMMO BI INCL CAD: CPT | Mod: TC

## 2024-08-03 PROCEDURE — 77063 BREAST TOMOSYNTHESIS BI: CPT | Mod: 26,,, | Performed by: RADIOLOGY

## 2024-08-03 PROCEDURE — 77067 SCR MAMMO BI INCL CAD: CPT | Mod: 26,,, | Performed by: RADIOLOGY

## 2024-08-03 PROCEDURE — 77063 BREAST TOMOSYNTHESIS BI: CPT | Mod: TC

## 2024-08-13 ENCOUNTER — OFFICE VISIT (OUTPATIENT)
Dept: URGENT CARE | Facility: CLINIC | Age: 49
End: 2024-08-13
Payer: MEDICAID

## 2024-08-13 VITALS
TEMPERATURE: 98 F | BODY MASS INDEX: 35.95 KG/M2 | HEIGHT: 68 IN | WEIGHT: 237.19 LBS | OXYGEN SATURATION: 97 % | DIASTOLIC BLOOD PRESSURE: 83 MMHG | SYSTOLIC BLOOD PRESSURE: 134 MMHG | RESPIRATION RATE: 20 BRPM | HEART RATE: 84 BPM

## 2024-08-13 DIAGNOSIS — B96.89 BACTERIAL SINUSITIS: ICD-10-CM

## 2024-08-13 DIAGNOSIS — J34.9 SINUS PROBLEM: Primary | ICD-10-CM

## 2024-08-13 DIAGNOSIS — J32.9 BACTERIAL SINUSITIS: ICD-10-CM

## 2024-08-13 LAB
CTP QC/QA: YES
SARS-COV-2 AG RESP QL IA.RAPID: NEGATIVE

## 2024-08-13 PROCEDURE — 87811 SARS-COV-2 COVID19 W/OPTIC: CPT | Mod: QW,S$GLB,, | Performed by: NURSE PRACTITIONER

## 2024-08-13 PROCEDURE — 99213 OFFICE O/P EST LOW 20 MIN: CPT | Mod: S$GLB,,, | Performed by: NURSE PRACTITIONER

## 2024-08-13 RX ORDER — AMOXICILLIN AND CLAVULANATE POTASSIUM 875; 125 MG/1; MG/1
1 TABLET, FILM COATED ORAL EVERY 12 HOURS
Qty: 14 TABLET | Refills: 0 | Status: SHIPPED | OUTPATIENT
Start: 2024-08-13 | End: 2024-08-20

## 2024-08-13 NOTE — PATIENT INSTRUCTIONS
"                                                          Sinusitis     If your condition worsens or fails to improve we recommend that you receive another evaluation at the urgent care/ER immediately or contact your PCP to discuss your concerns. You must understand that you've received an urgent care treatment only and that you may be released before all your medical problems are known or treated. You the patient will arrange for followup care as instructed.   Take antibiotics with food and as directed.   Flonase (fluticasone) is a nasal spray which is available over the counter and may help with your symptoms   Zyrtec D, Claritin D or allegra D can also help with symptoms of congestion and drainage.   If you have hypertension avoid using the "D" which is the decongestant   If you just have drainage you can take plain zyrtec, claritin or allegra   Mucinex DM for cough  Rest and fluids are also important.     Tylenol or ibuprofen can also be used as directed for pain unless you have an allergy to them or medical condition such as stomach ulcers, kidney or liver disease or blood thinners etc for which you should not be taking these type of medications.     If symptoms do not improve in 2-3 days please return for evaluation    You have been given a wait and see prescription for Augmentin. Please wait until 8/15/2024 to see if your symptoms are improving before starting this medication. If you start the prescription, be sure to complete entire course of treatment.   "

## 2024-08-13 NOTE — PROGRESS NOTES
"Subjective:      Patient ID: Pilar Lester is a 48 y.o. female.    Vitals:  height is 5' 8" (1.727 m) and weight is 107.6 kg (237 lb 3.4 oz). Her oral temperature is 97.9 °F (36.6 °C). Her blood pressure is 134/83 and her pulse is 84. Her respiration is 20 and oxygen saturation is 97%.     Chief Complaint: Sinus Problem    Pt presents today with productive cough, post nasal drip, headache sx started 5 days ago and progressed to worsen 3 days ago, tx:  zyrtec, alk day/night, tylenol denies any fever     Sinus Problem  This is a new problem. The current episode started in the past 7 days. The problem has been gradually worsening since onset. There has been no fever. Her pain is at a severity of 0/10. She is experiencing no pain. Associated symptoms include congestion, coughing, headaches, sinus pressure (causing headaches) and a sore throat. Pertinent negatives include no chills, diaphoresis, ear pain, hoarse voice, neck pain, shortness of breath, sneezing or swollen glands. Past treatments include acetaminophen and oral decongestants. The treatment provided no relief.       Constitution: Negative for chills and sweating.   HENT:  Positive for congestion, postnasal drip, sinus pressure (causing headaches) and sore throat. Negative for ear pain.    Neck: Negative for neck pain.   Respiratory:  Positive for cough. Negative for shortness of breath.    Allergic/Immunologic: Negative for sneezing.   Neurological:  Positive for headaches.      Objective:     Physical Exam   Constitutional: She is oriented to person, place, and time. She appears well-developed. She is cooperative.  Non-toxic appearance. She does not appear ill. No distress.   HENT:   Head: Normocephalic and atraumatic.   Ears:   Right Ear: Hearing, tympanic membrane, external ear and ear canal normal.   Left Ear: Hearing, tympanic membrane, external ear and ear canal normal.   Nose: No mucosal edema, rhinorrhea or nasal deformity. No epistaxis. Right sinus " exhibits maxillary sinus tenderness and frontal sinus tenderness. Left sinus exhibits maxillary sinus tenderness and frontal sinus tenderness.   Mouth/Throat: Uvula is midline, oropharynx is clear and moist and mucous membranes are normal. No trismus in the jaw. Normal dentition. No uvula swelling. No oropharyngeal exudate, posterior oropharyngeal edema or posterior oropharyngeal erythema.   Eyes: Conjunctivae and lids are normal. No scleral icterus.   Neck: Trachea normal and phonation normal. Neck supple. No edema present. No erythema present. No neck rigidity present.   Cardiovascular: Normal rate, regular rhythm, normal heart sounds and normal pulses.   Pulmonary/Chest: Effort normal and breath sounds normal. No respiratory distress. She has no decreased breath sounds. She has no rhonchi.   Abdominal: Normal appearance.   Musculoskeletal: Normal range of motion.         General: No deformity. Normal range of motion.   Neurological: She is alert and oriented to person, place, and time. She exhibits normal muscle tone. Coordination normal.   Skin: Skin is warm, dry, intact, not diaphoretic and not pale.   Psychiatric: Her speech is normal and behavior is normal. Judgment and thought content normal.   Nursing note and vitals reviewed.      Assessment:     1. Sinus problem    2. Bacterial sinusitis        Plan:       Sinus problem  -     SARS Coronavirus 2 Antigen, POCT Manual Read    Bacterial sinusitis        Results for orders placed or performed in visit on 08/13/24   SARS Coronavirus 2 Antigen, POCT Manual Read   Result Value Ref Range    SARS Coronavirus 2 Antigen Negative Negative     Acceptable Yes            Patient Instructions                                                             Sinusitis     If your condition worsens or fails to improve we recommend that you receive another evaluation at the urgent care/ER immediately or contact your PCP to discuss your concerns. You must understand  "that you've received an urgent care treatment only and that you may be released before all your medical problems are known or treated. You the patient will arrange for followup care as instructed.   Take antibiotics with food and as directed.   Flonase (fluticasone) is a nasal spray which is available over the counter and may help with your symptoms   Zyrtec D, Claritin D or allegra D can also help with symptoms of congestion and drainage.   If you have hypertension avoid using the "D" which is the decongestant   If you just have drainage you can take plain zyrtec, claritin or allegra   Mucinex DM for cough  Rest and fluids are also important.     Tylenol or ibuprofen can also be used as directed for pain unless you have an allergy to them or medical condition such as stomach ulcers, kidney or liver disease or blood thinners etc for which you should not be taking these type of medications.     If symptoms do not improve in 2-3 days please return for evaluation    You have been given a wait and see prescription for Augmentin. Please wait until 8/15/2024 to see if your symptoms are improving before starting this medication. If you start the prescription, be sure to complete entire course of treatment.         "

## 2024-08-19 ENCOUNTER — PROCEDURE VISIT (OUTPATIENT)
Dept: FAMILY MEDICINE | Facility: HOSPITAL | Age: 49
End: 2024-08-19
Payer: MEDICAID

## 2024-08-19 VITALS
WEIGHT: 234.56 LBS | BODY MASS INDEX: 35.55 KG/M2 | HEIGHT: 68 IN | HEART RATE: 92 BPM | SYSTOLIC BLOOD PRESSURE: 130 MMHG | DIASTOLIC BLOOD PRESSURE: 81 MMHG

## 2024-08-19 DIAGNOSIS — M17.12 PRIMARY OSTEOARTHRITIS OF LEFT KNEE: ICD-10-CM

## 2024-08-19 RX ORDER — SEMAGLUTIDE 0.25 MG/.5ML
0.25 INJECTION, SOLUTION SUBCUTANEOUS
Qty: 0.5 ML | Refills: 0 | Status: SHIPPED | OUTPATIENT
Start: 2024-08-19 | End: 2024-08-19

## 2024-08-19 RX ORDER — SEMAGLUTIDE 0.25 MG/.5ML
0.25 INJECTION, SOLUTION SUBCUTANEOUS
Qty: 2 ML | Refills: 0 | Status: SHIPPED | OUTPATIENT
Start: 2024-08-19 | End: 2024-08-24

## 2024-08-19 NOTE — PROCEDURES
Patient Name: Pilar Lester  Date of Procedure: December 08, 2023  Time of Procedure: 8:45AM  Procedure: Corticosteroid Injection (CSI) into the left knee     Indications: The patient was diagnosed with acute left knee pain and agreed to the left knee corticosteroid injection after conservative management options were exhausted. The risks, benefits, and alternatives were discussed with the patient, who gave informed consent for the procedure.     Procedure in Detail:  1. The left knee was identified and marked.  2. The skin was prepped with Chlorhexidine, followed by ethyl chloride spray for local anesthesia.  3. A 22-gauge needle was inserted into the intra-articular space of the left knee under aseptic technique.  4. A mixture of 1cc of Kenalog (LOT #: [id number]), 2cc of 1% lidocaine, and 2cc of 0.5% bupivacaine was then slowly injected without difficulty or complication.  5. The needle was withdrawn and the injection site was cleaned and covered with a sterile dressing.  6. The patient tolerated the procedure well.     Post-procedure Plan:  The patient was advised to rest and avoid strenuous activity for the next 24-48 hours. Ice application was recommended for the next 24 hours to minimize swelling. Pain relief should be expected within 24-48 hours. The patient was advised to report any signs of infection, such as redness, swelling, or increased pain at the injection site.    France Morris MD  [facility], PGY2

## 2024-08-22 NOTE — PROGRESS NOTES
I assume primary medical responsibility for this patient. I have reviewed the history, physical, and assessement & treatment plan with the resident and agree that the care is reasonable and necessary. This service has been performed by a resident with the presence of a teaching physician under the primary care exception. If necessary, an addendum of additional findings or evaluation beyond the resident documentation will be noted below.    Patient tolerated procedure without difficulty. Post-procedure care instructions given to patient, patient expressed understanding.     Ellen Lynn MD    [facility]

## 2024-08-23 ENCOUNTER — PATIENT MESSAGE (OUTPATIENT)
Dept: FAMILY MEDICINE | Facility: HOSPITAL | Age: 49
End: 2024-08-23
Payer: MEDICAID

## 2024-08-24 RX ORDER — TIRZEPATIDE 2.5 MG/.5ML
2.5 INJECTION, SOLUTION SUBCUTANEOUS
Qty: 0.5 ML | Refills: 0 | Status: SHIPPED | OUTPATIENT
Start: 2024-08-24

## 2024-08-24 NOTE — PROGRESS NOTES
Patient with elevated BMI wishing to try Zepbound to aid in weight loss.    France Morris MD  [facility], PGY2

## 2024-08-28 ENCOUNTER — TELEPHONE (OUTPATIENT)
Dept: HEPATOLOGY | Facility: HOSPITAL | Age: 49
End: 2024-08-28
Payer: MEDICAID

## 2024-08-28 NOTE — TELEPHONE ENCOUNTER
"Attempted to return patient's phone call in regards to "needing to switching her medication." Went to voicemail.    France Morris MD  [facility], PGY2  "

## 2024-09-17 ENCOUNTER — TELEPHONE (OUTPATIENT)
Dept: FAMILY MEDICINE | Facility: HOSPITAL | Age: 49
End: 2024-09-17
Payer: MEDICAID

## 2024-09-17 NOTE — TELEPHONE ENCOUNTER
----- Message from Gomez Byrd sent at 9/17/2024  9:06 AM CDT -----  Contact: pt  Type:  Same Day Appointment Request    Caller is requesting a same day appointment.  Caller declined first available appointment listed below.    Name of Caller:pt   When is the first available appointment?Oct.   Symptoms:coughing,ear and throat pain  Best Call Back Number:[phone]  Additional Information:

## 2024-09-26 RX ORDER — TIRZEPATIDE 2.5 MG/.5ML
2.5 INJECTION, SOLUTION SUBCUTANEOUS
Qty: 0.5 ML | Refills: 0 | Status: SHIPPED | OUTPATIENT
Start: 2024-09-26

## 2024-10-29 RX ORDER — TIRZEPATIDE 2.5 MG/.5ML
2.5 INJECTION, SOLUTION SUBCUTANEOUS
Qty: 0.5 ML | Refills: 0 | Status: SHIPPED | OUTPATIENT
Start: 2024-10-29

## 2024-11-02 ENCOUNTER — PATIENT MESSAGE (OUTPATIENT)
Dept: FAMILY MEDICINE | Facility: HOSPITAL | Age: 49
End: 2024-11-02
Payer: MEDICAID

## 2024-11-05 RX ORDER — TIRZEPATIDE 5 MG/.5ML
5 INJECTION, SOLUTION SUBCUTANEOUS
Qty: 0.5 ML | Refills: 3 | Status: SHIPPED | OUTPATIENT
Start: 2024-11-05

## 2024-12-15 ENCOUNTER — ON-DEMAND VIRTUAL (OUTPATIENT)
Dept: URGENT CARE | Facility: CLINIC | Age: 49
End: 2024-12-15
Payer: MEDICAID

## 2024-12-15 DIAGNOSIS — J06.9 UPPER RESPIRATORY TRACT INFECTION, UNSPECIFIED TYPE: Primary | ICD-10-CM

## 2024-12-15 PROCEDURE — 99213 OFFICE O/P EST LOW 20 MIN: CPT | Mod: 95,,, | Performed by: NURSE PRACTITIONER

## 2024-12-15 RX ORDER — AZELASTINE 1 MG/ML
1 SPRAY, METERED NASAL 2 TIMES DAILY
Qty: 30 ML | Refills: 0 | Status: SHIPPED | OUTPATIENT
Start: 2024-12-15 | End: 2025-01-14

## 2024-12-15 RX ORDER — BENZONATATE 200 MG/1
200 CAPSULE ORAL 3 TIMES DAILY PRN
Qty: 30 CAPSULE | Refills: 0 | Status: SHIPPED | OUTPATIENT
Start: 2024-12-15 | End: 2024-12-25

## 2024-12-15 NOTE — PROGRESS NOTES
Subjective:      Patient ID: Pilar Lester is a 49 y.o. female.    Vitals:  vitals were not taken for this visit.     Chief Complaint: URI      Visit Type: TELE AUDIOVISUAL    Present with the patient at the time of consultation: TELEMED PRESENT WITH PATIENT: None    Patient Location: Home      Past Medical History:   Diagnosis Date    Asthma      Past Surgical History:   Procedure Laterality Date    CHOLECYSTECTOMY      ESOPHAGOGASTRODUODENOSCOPY      ESOPHAGOGASTRODUODENOSCOPY N/A 12/18/2020    Procedure: EGD (ESOPHAGOGASTRODUODENOSCOPY);  Surgeon: Markel Duff MD;  Location: [facility];  Service: Endoscopy;  Laterality: N/A;    TUBAL LIGATION       Review of patient's allergies indicates:   Allergen Reactions    Trazodone Swelling     Current Outpatient Medications on File Prior to Visit   Medication Sig Dispense Refill    albuterol (ACCUNEB) 0.63 mg/3 mL Nebu albuterol sulfate Take No date recorded No form recorded No frequency recorded No route recorded No set duration recorded No set duration amount recorded active No dosage strength recorded No dosage strength units of measure recorded      celecoxib (CELEBREX) 100 MG capsule Take 1 capsule (100 mg total) by mouth daily as needed for Pain. (Patient not taking: Reported on 12/8/2023) 30 capsule 0    gabapentin (NEURONTIN) 100 MG capsule gabapentin Take No date recorded No form recorded No frequency recorded No route recorded No set duration recorded No set duration amount recorded active No dosage strength recorded No dosage strength units of measure recorded      tirzepatide, weight loss, (ZEPBOUND) 5 mg/0.5 mL PnIj Inject 5 mg into the skin every 7 days. 0.5 mL 3     No current facility-administered medications on file prior to visit.     Family History   Problem Relation Name Age of Onset    Breast cancer Mother  50       Medications Ordered                [id number] - [place], [place] - [address]   [address], [place] [address]    Telephone:  [phone]   Fax: [phone]   Hours: Not open 24 hours                         E-Prescribed (2 of 2)              azelastine (ASTELIN) 137 mcg (0.1 %) nasal spray    Si spray (137 mcg total) by Nasal route 2 (two) times daily.       Start: 12/15/24     Quantity: 30 mL Refills: 0                         benzonatate (TESSALON) 200 MG capsule    Sig: Take 1 capsule (200 mg total) by mouth 3 (three) times daily as needed for Cough.       Start: 12/15/24     Quantity: 30 capsule Refills: 0                           Ohs Peq Odvv Intake    12/15/2024 12:09 PM CST - Filed by Patient   What is your current physical address in the event of a medical emergency? [address]   Are you able to take your vital signs? No   Please attach any relevant images or files    Is your employer contracted with [facility]? No         50 y/o female with c/o cough, congestion, and sinus pressure x3 days. Denies fever, chills, n/v.         Constitution: Negative for chills and fever.   HENT:  Positive for congestion, postnasal drip, sinus pressure and sore throat.    Respiratory:  Positive for cough. Negative for shortness of breath.         Objective:   The physical exam was conducted virtually.  Physical Exam   Constitutional: She is oriented to person, place, and time. She is cooperative. She does not appear ill. No distress. awake  HENT:   Head: Normocephalic.   Abdominal: Normal appearance.   Neurological: She is alert and oriented to person, place, and time.   Psychiatric: Judgment normal.       Assessment:     1. Upper respiratory tract infection, unspecified type        Plan:       Upper respiratory tract infection, unspecified type  -     azelastine (ASTELIN) 137 mcg (0.1 %) nasal spray; 1 spray (137 mcg total) by Nasal route 2 (two) times daily.  Dispense: 30 mL; Refill: 0  -     benzonatate (TESSALON) 200 MG capsule; Take 1 capsule (200 mg total) by mouth 3 (three) times daily as needed for Cough.  Dispense: 30  capsule; Refill: 0    Continue OTC Mucinex as directed    Follow-up with Primary Care as discussed for further refills.     Patient encouraged to monitor symptoms closely and instructed to follow-up for new or worsening symptoms. Further, in-person, evaluation may be necessary for continued treatment. Please follow up with your primary care doctor or specialist as needed. Verbally discussed plan. Patient confirms understanding and is in agreement with treatment and plan.      You must understand that you've received a Virtual Care evaluation only and that you may be released before all your medical problems are known or treated. You, the patient, will arrange for follow up care as instructed.

## 2025-01-03 RX ORDER — TIRZEPATIDE 7.5 MG/.5ML
7.5 INJECTION, SOLUTION SUBCUTANEOUS
Qty: 0.5 ML | Refills: 3 | Status: SHIPPED | OUTPATIENT
Start: 2025-01-03

## 2025-01-04 ENCOUNTER — PATIENT MESSAGE (OUTPATIENT)
Dept: FAMILY MEDICINE | Facility: HOSPITAL | Age: 50
End: 2025-01-04
Payer: MEDICAID

## 2025-02-16 ENCOUNTER — ON-DEMAND VIRTUAL (OUTPATIENT)
Dept: URGENT CARE | Facility: CLINIC | Age: 50
End: 2025-02-16
Payer: MEDICAID

## 2025-02-16 DIAGNOSIS — M54.31 SCIATICA, RIGHT SIDE: Primary | ICD-10-CM

## 2025-02-16 DIAGNOSIS — R20.2 PARESTHESIA OF RIGHT LOWER EXTREMITY: ICD-10-CM

## 2025-02-16 DIAGNOSIS — R51.9 NONINTRACTABLE HEADACHE, UNSPECIFIED CHRONICITY PATTERN, UNSPECIFIED HEADACHE TYPE: ICD-10-CM

## 2025-02-16 RX ORDER — GABAPENTIN 300 MG/1
300 CAPSULE ORAL 2 TIMES DAILY
Qty: 14 CAPSULE | Refills: 0 | Status: SHIPPED | OUTPATIENT
Start: 2025-02-16 | End: 2025-02-23

## 2025-02-16 RX ORDER — METHOCARBAMOL 750 MG/1
750 TABLET, FILM COATED ORAL 4 TIMES DAILY PRN
Qty: 40 TABLET | Refills: 0 | Status: SHIPPED | OUTPATIENT
Start: 2025-02-16

## 2025-02-16 RX ORDER — METHYLPREDNISOLONE 4 MG/1
TABLET ORAL
Qty: 21 EACH | Refills: 0 | Status: SHIPPED | OUTPATIENT
Start: 2025-02-16 | End: 2025-03-09

## 2025-02-16 NOTE — PROGRESS NOTES
Subjective:      Patient ID: Pilar Lester is a 49 y.o. female.    Vitals:  vitals were not taken for this visit.     Chief Complaint: Back Pain (Sciatica on the right.  )      Visit Type: TELE AUDIOVISUAL    Past Medical History:   Diagnosis Date    Asthma      Past Surgical History:   Procedure Laterality Date    CHOLECYSTECTOMY      ESOPHAGOGASTRODUODENOSCOPY      ESOPHAGOGASTRODUODENOSCOPY N/A 12/18/2020    Procedure: EGD (ESOPHAGOGASTRODUODENOSCOPY);  Surgeon: Markel Duff MD;  Location: [facility];  Service: Endoscopy;  Laterality: N/A;    TUBAL LIGATION       Review of patient's allergies indicates:   Allergen Reactions    Trazodone Swelling     Medications Ordered Prior to Encounter[1]  Family History   Problem Relation Name Age of Onset    Breast cancer Mother  50       Medications Ordered                [id number] - [place] - [address]   [address], [place] [address]    Telephone: [phone]   Fax: [phone]   Hours: Not open 24 hours                         E-Prescribed (3 of 3)              gabapentin (NEURONTIN) 300 MG capsule    Sig: Take 1 capsule (300 mg total) by mouth 2 (two) times daily. for 7 days       Start: 2/16/25     Quantity: 14 capsule Refills: 0                         methocarbamoL (ROBAXIN) 750 MG Tab    Sig: Take 1 tablet (750 mg total) by mouth 4 (four) times daily as needed (muscle pain).       Start: 2/16/25     Quantity: 40 tablet Refills: 0                         methylPREDNISolone (MEDROL DOSEPACK) 4 mg tablet    Sig: use as directed       Start: 2/16/25     Quantity: 21 each Refills: 0                           Ohs Peq Odvv Intake    2/16/2025  4:49 PM CST - Filed by Patient   What is your current physical address in the event of a medical emergency? [address] [place]   Are you able to take your vital signs? No   Please attach any relevant images or files    Is your employer contracted with [facility]? No         Reports sciatica pain on  the right which began yesterday morning but is worse today.  She has had similar pain in the past.  No incontinence of bowel or bladder.  No fevers.    Two patient identifiers were used-name was repeated verbally as well as date of birth.  The patient was located in their home in the [facility].        Musculoskeletal:  Positive for back pain.   Neurological:  Positive for numbness and tingling.      Objective:   The physical exam was conducted virtually.  Physical Exam   Constitutional: She is oriented to person, place, and time. No distress.      Comments:Appears uncomfortable     HENT:   Head: Normocephalic and atraumatic.   Neck: Neck supple.   Pulmonary/Chest: Effort normal. No respiratory distress.   Musculoskeletal: Normal range of motion.         General: Normal range of motion.   Neurological: no focal deficit. She is alert and oriented to person, place, and time.   Skin: Skin is not pale.   Psychiatric: Her behavior is normal. Mood, judgment and thought content normal.     Assessment:     1. Sciatica, right side    2. Nonintractable headache, unspecified chronicity pattern, unspecified headache type    3. Paresthesia of right lower extremity        Plan:       Sciatica, right side    Nonintractable headache, unspecified chronicity pattern, unspecified headache type    Paresthesia of right lower extremity    Other orders  -     gabapentin (NEURONTIN) 300 MG capsule; Take 1 capsule (300 mg total) by mouth 2 (two) times daily. for 7 days  Dispense: 14 capsule; Refill: 0  -     methylPREDNISolone (MEDROL DOSEPACK) 4 mg tablet; use as directed  Dispense: 21 each; Refill: 0  -     methocarbamoL (ROBAXIN) 750 MG Tab; Take 1 tablet (750 mg total) by mouth 4 (four) times daily as needed (muscle pain).  Dispense: 40 tablet; Refill: 0    PLEASE READ YOUR DISCHARGE INSTRUCTIONS ENTIRELY AS IT CONTAINS IMPORTANT INFORMATION.      Please drink plenty of fluids.  Please get plenty of rest.  Ice to the area.   You  may do gently stretching if tolerable.    Please return here or go to the Emergency Department for any concerns or worsening of condition.    If you were prescribed a  muscle relaxer , do not drive or operate heavy equipment or machinery while taking these medications. Take a half first to see how it affects you    Take Ibuprofen or Aleve with food. Also take an over the counter antacid with it (prilosec, Nexium, Pepcid) to protect your stomach.     If you were not prescribed an anti-inflammatory medication, and if you do not have any history of stomach/intestinal ulcers, or kidney disease, or are not taking a blood thinner such as Coumadin, Plavix, Pradaxa, Eloquis, or Xaralta for example, it is OK to take over the counter Ibuprofen or Advil or Motrin or Aleve as directed.  Do not take these medications on an empty stomach.    If you lose control of your bowel and/or bladder, please go to the nearest Emergency Department immediately.    If you lose sensation in between your legs by your genitalia and/or rectum, please go to the nearest Emergency Department immediately.    If you lose control or sensation of any extremity, please go to the nearest Emergency Department immediately.    Do not stay in one position to long.  When sleeping on your back place a pillow under knees to reduce tension on back.  If sleeping on your side, place pillow between knees to keep spine in better alinement.  Wear supportive shoes such as tennis shoes for support of the lower back.  Take any medication as directed.    Please follow up with your primary care doctor or specialist as needed.    If you  smoke, please stop smoking.      Please arrange follow up with your primary medical clinic as soon as possible. You must understand that you've received an Virtual Care treatment only and that you may be released before all of your medical problems are known or treated. You, the patient, will arrange for follow up as instructed. If your  symptoms worsen or fail to improve you should go to the Emergency Room.             Present with the patient at the time of consultation: TELEMED PRESENT WITH PATIENT: None         [1]   Current Outpatient Medications on File Prior to Visit   Medication Sig Dispense Refill    albuterol (ACCUNEB) 0.63 mg/3 mL Nebu albuterol sulfate Take No date recorded No form recorded No frequency recorded No route recorded No set duration recorded No set duration amount recorded active No dosage strength recorded No dosage strength units of measure recorded      azelastine (ASTELIN) 137 mcg (0.1 %) nasal spray 1 spray (137 mcg total) by Nasal route 2 (two) times daily. 30 mL 0    celecoxib (CELEBREX) 100 MG capsule Take 1 capsule (100 mg total) by mouth daily as needed for Pain. (Patient not taking: Reported on 12/8/2023) 30 capsule 0    tirzepatide, weight loss, (ZEPBOUND) 7.5 mg/0.5 mL PnIj Inject 7.5 mg into the skin every 7 days. 0.5 mL 3    [DISCONTINUED] gabapentin (NEURONTIN) 100 MG capsule gabapentin Take No date recorded No form recorded No frequency recorded No route recorded No set duration recorded No set duration amount recorded active No dosage strength recorded No dosage strength units of measure recorded       No current facility-administered medications on file prior to visit.

## 2025-02-24 ENCOUNTER — OFFICE VISIT (OUTPATIENT)
Dept: FAMILY MEDICINE | Facility: HOSPITAL | Age: 50
End: 2025-02-24
Payer: MEDICAID

## 2025-02-24 VITALS
BODY MASS INDEX: 33.91 KG/M2 | SYSTOLIC BLOOD PRESSURE: 126 MMHG | WEIGHT: 223.75 LBS | DIASTOLIC BLOOD PRESSURE: 84 MMHG | HEART RATE: 102 BPM | OXYGEN SATURATION: 100 % | HEIGHT: 68 IN

## 2025-02-24 DIAGNOSIS — E66.811 OBESITY (BMI 30.0-34.9): ICD-10-CM

## 2025-02-24 DIAGNOSIS — N93.9 ABNORMAL UTERINE BLEEDING (AUB): ICD-10-CM

## 2025-02-24 DIAGNOSIS — Z23 ENCOUNTER FOR ADMINISTRATION OF VACCINE: ICD-10-CM

## 2025-02-24 DIAGNOSIS — M54.31 SCIATICA OF RIGHT SIDE: Primary | ICD-10-CM

## 2025-02-24 DIAGNOSIS — E66.811 CLASS 1 OBESITY WITH BODY MASS INDEX (BMI) OF 34.0 TO 34.9 IN ADULT, UNSPECIFIED OBESITY TYPE, UNSPECIFIED WHETHER SERIOUS COMORBIDITY PRESENT: ICD-10-CM

## 2025-02-24 PROCEDURE — 99214 OFFICE O/P EST MOD 30 MIN: CPT

## 2025-02-24 NOTE — PROGRESS NOTES
Flu vaccine 0.5ml given IM in left deltoid . Tolerated well . Instructed to remain in clinic x 15 min to observe for adverse reactions.-DP

## 2025-02-24 NOTE — PROGRESS NOTES
Clinic Note  [facility]    Subjective:     Pilar Lester is a 49 y.o. year old who presents to clinic today to establish care.    PMHx: AUB, Obesity, Osteoarthritis, Sciatica    AUB:  - 3/2024: Saw Dr. Cowan for 2-day hx of light pink vaginal spotting. LMP 2023.  - US 3/18/24: Posterior uterine body fibroid. Endometrial stripe thickness of 8 mm with nonspecific heterogeneity at the left aspect near the fundus.  This would technically be thickened if the patient is postmenopausal, for which further correlation would be advised.  To correlate with history. Nonspecific complex cystic area at the right ovary.  Overall appearance is nonspecific and could relate to hemorrhagic cyst with partial retracted clot.  Subsequent sonographic follow-up in 8-12 weeks can be obtained to ensure resolution.  - Referred to GYN multiple times for biopsy, no showed/cancelled each appt    Obesity:  - 24: 234lbs  - 25: 223lbs  - Current regimen: Zepbound 7.5mg    Osteoarthritis:  - Injection 24. Reports that it has helped significantly.  - Multiple missed PT sessions    Sciatica (Right side):  - First documented on Epic  by Dr. Portillo  - Seen via Virtual visit 25, was prescribed Gabapentin, Methylprednisone, and Methocarbamol  - Reports that medications have made pain more bearable - able to get out of bed & move around, but pain is not totally gone. Denies bladder/bowel incontinence.       Review of Systems negative except for symptoms mentioned in HPI      Gynecological History:  - Menstruation: No LMP recorded.  - Mammogram:  - BIRADS 1 - Negative  - Pap smear:  - Sexual History:   - Contraception: N/A    Obstetric History:     Has not yet done cologuard    Health Maintenance         Date Due Completion Date    Cervical Cancer Screening Never done ---    TETANUS VACCINE Never done ---    Colorectal Cancer Screening Never done ---    Shingles Vaccine (1 of 2) Never done ---     Pneumococcal Vaccines (Age 50+) (1 of 1 - PCV) Never done ---    COVID-19 Vaccine (4 - 2024-25 season) 09/01/2024 12/13/2021    Mammogram 08/03/2025 8/3/2024    Hemoglobin A1c (Diabetic Prevention Screening) 02/24/2028 2/24/2025    Lipid Panel 02/24/2030 2/24/2025    RSV Vaccine (Age 60+ and Pregnant patients) (1 - 1-dose 75+ series) 09/28/2048 ---            Past Medical History:   Diagnosis Date    Asthma        Past Surgical History:   Procedure Laterality Date    CHOLECYSTECTOMY      ESOPHAGOGASTRODUODENOSCOPY      ESOPHAGOGASTRODUODENOSCOPY N/A 12/18/2020    Procedure: EGD (ESOPHAGOGASTRODUODENOSCOPY);  Surgeon: Markel Duff MD;  [facility]: [facility];  Service: Endoscopy;  Laterality: N/A;    TUBAL LIGATION         Family History   Problem Relation Name Age of Onset    Breast cancer Mother Dejah Church 50    Cancer Mother Dejah Church     Diabetes Mother Dejah Church     Hypertension Father Abdirashid Church     Asthma Son Rojelio garcia        Social History     Socioeconomic History    Marital status:    Tobacco Use    Smoking status: Never    Smokeless tobacco: Never   Substance and Sexual Activity    Alcohol use: Never    Drug use: Never    Sexual activity: Yes     Partners: Male     Birth control/protection: Post-menopausal, Other-see comments     Comment: Tubes tied     Social Drivers of Health     Financial Resource Strain: Patient Declined (3/13/2024)    Overall Financial Resource Strain (CARDIA)     Difficulty of Paying Living Expenses: Patient declined   Food Insecurity: No Food Insecurity (3/13/2024)    Hunger Vital Sign     Worried About Running Out of Food in the Last Year: Never true     Ran Out of Food in the Last Year: Never true   Transportation Needs: No Transportation Needs (3/13/2024)    PRAPARE - Transportation     Lack of Transportation (Medical): No     Lack of Transportation (Non-Medical): No   Physical Activity: Unknown (3/13/2024)    Exercise Vital Sign     Days of  Exercise per Week: Patient declined   Stress: Stress Concern Present (3/13/2024)    [employer] [place] [employer] - Occupational Stress Questionnaire     Feeling of Stress : Very much   Housing Stability: Unknown (3/13/2024)    Housing Stability Vital Sign     Unable to Pay for Housing in the Last Year: Patient declined     Unstable Housing in the Last Year: No       Current Medications[1]    Review of patient's allergies indicates:   Allergen Reactions    Trazodone Swelling         Objective:     Vitals:    02/24/25 0848   BP: 126/84   Pulse: 102       Wt Readings from Last 1 Encounters:   02/24/25 101.5 kg (223 lb 12.3 oz)       Physical Exam  Constitutional:       General: She is not in acute distress.  HENT:      Head: Normocephalic and atraumatic.   Eyes:      General:         Right eye: No discharge.         Left eye: No discharge.      Conjunctiva/sclera: Conjunctivae normal.   Cardiovascular:      Rate and Rhythm: Normal rate.   Pulmonary:      Effort: Pulmonary effort is normal. No respiratory distress.   Musculoskeletal:      Right lower leg: No edema.      Left lower leg: No edema.   Neurological:      Mental Status: She is alert and oriented to person, place, and time.      Gait: Gait normal.   Psychiatric:         Mood and Affect: Mood normal.         Behavior: Behavior normal.         Assessment/Plan:     Pilar was seen today for sciatica and flu vaccine.    Diagnoses and all orders for this visit:    Sciatica of right side  Acute on chronic condition. Uncontrolled, though improved. No acute concerning symptoms or physical exam findings identified. Stressed the importance of PT. Patient agreeable to going.  -     Ambulatory referral/consult to Physical/Occupational Therapy; Future    Abnormal uterine bleeding (AUB)  Chronic condition. Encouraged completing labs prescribed at last visit and making GYN appointment. Due to presence of fibroid, might require more specialized equipment for  biopsy.    Class 1 obesity with body mass index (BMI) of 34.0 to 34.9 in adult, unspecified obesity type, unspecified whether serious comorbidity present  -     CBC Auto Differential; Future  -     Comprehensive Metabolic Panel; Future  -     Lipid Panel; Future  -     Hemoglobin A1C; Future    Encounter for administration of vaccine  Desires vaccination. Appropriate for administration at this time.  -     influenza (Flulaval, Fluzone, Fluarix) 45 mcg/0.5 mL IM vaccine (> or = 6 mo) 0.5 mL    Obesity (BMI 30.0-34.9)  Chronic condition. Losing weight at appropriate pace. Just recently changed dose. Will continue to observe trend at current dose.          Case discussed with staff: Dr. Leah Morris MD PGY-2  [facility]          [1]   Current Outpatient Medications   Medication Sig Dispense Refill    albuterol (ACCUNEB) 0.63 mg/3 mL Nebu albuterol sulfate Take No date recorded No form recorded No frequency recorded No route recorded No set duration recorded No set duration amount recorded active No dosage strength recorded No dosage strength units of measure recorded      methocarbamoL (ROBAXIN) 750 MG Tab Take 1 tablet (750 mg total) by mouth 4 (four) times daily as needed (muscle pain). 40 tablet 0    tirzepatide, weight loss, (ZEPBOUND) 7.5 mg/0.5 mL PnIj Inject 7.5 mg into the skin every 7 days. 0.5 mL 3    azelastine (ASTELIN) 137 mcg (0.1 %) nasal spray 1 spray (137 mcg total) by Nasal route 2 (two) times daily. 30 mL 0    celecoxib (CELEBREX) 100 MG capsule Take 1 capsule (100 mg total) by mouth daily as needed for Pain. (Patient not taking: Reported on 2/24/2025) 30 capsule 0    gabapentin (NEURONTIN) 300 MG capsule Take 1 capsule (300 mg total) by mouth 2 (two) times daily. for 7 days 14 capsule 0    methylPREDNISolone (MEDROL DOSEPACK) 4 mg tablet use as directed (Patient not taking: Reported on 2/24/2025) 21 each 0     No current facility-administered medications for this visit.

## 2025-03-07 PROBLEM — Z74.09 IMPAIRED FUNCTIONAL MOBILITY AND ACTIVITY TOLERANCE: Status: ACTIVE | Noted: 2025-03-07

## 2025-03-07 PROBLEM — M25.69 DECREASED RANGE OF MOTION OF TRUNK AND BACK: Status: ACTIVE | Noted: 2025-03-07

## 2025-03-12 ENCOUNTER — OFFICE VISIT (OUTPATIENT)
Dept: FAMILY MEDICINE | Facility: HOSPITAL | Age: 50
End: 2025-03-12
Payer: MEDICAID

## 2025-03-12 VITALS
RESPIRATION RATE: 18 BRPM | DIASTOLIC BLOOD PRESSURE: 80 MMHG | SYSTOLIC BLOOD PRESSURE: 111 MMHG | OXYGEN SATURATION: 100 % | WEIGHT: 218.5 LBS | BODY MASS INDEX: 33.12 KG/M2 | HEIGHT: 68 IN

## 2025-03-12 DIAGNOSIS — F41.9 ANXIETY: Primary | ICD-10-CM

## 2025-03-12 DIAGNOSIS — N93.9 ABNORMAL UTERINE BLEEDING (AUB): ICD-10-CM

## 2025-03-12 DIAGNOSIS — E66.811 OBESITY (BMI 30.0-34.9): ICD-10-CM

## 2025-03-12 PROCEDURE — 99214 OFFICE O/P EST MOD 30 MIN: CPT

## 2025-03-12 RX ORDER — TIRZEPATIDE 7.5 MG/.5ML
7.5 INJECTION, SOLUTION SUBCUTANEOUS
Qty: 2 ML | Refills: 3 | Status: SHIPPED | OUTPATIENT
Start: 2025-03-12

## 2025-03-12 RX ORDER — PHENTERMINE HYDROCHLORIDE 37.5 MG/1
18.75 TABLET ORAL 2 TIMES DAILY
COMMUNITY
Start: 2024-12-28 | End: 2025-03-12

## 2025-03-12 RX ORDER — SERTRALINE HYDROCHLORIDE 25 MG/1
25 TABLET, FILM COATED ORAL DAILY
Qty: 30 TABLET | Refills: 11 | Status: SHIPPED | OUTPATIENT
Start: 2025-03-12 | End: 2026-03-12

## 2025-03-12 RX ORDER — DOXYCYCLINE 100 MG/1
CAPSULE ORAL
COMMUNITY
Start: 2024-10-27

## 2025-03-12 NOTE — PROGRESS NOTES
Clinic Note  [facility]    Subjective:     Pilar Lester is a 49 y.o. year old who presents to clinic today for follow up.    PMHx: AUB, Obesity, Osteoarthritis, Sciatica    Anxiety:  - GAD7 3/12/25: 14 (Moderate)  - Has been taking her old,  Sertraline PRN. Has pill bottle in bag - 1 year .     AUB:  - 3/2024: Saw Dr. Cowan for 2-day hx of light pink vaginal spotting. LMP 2023.  - US 3/18/24: Posterior uterine body fibroid. Endometrial stripe thickness of 8 mm with nonspecific heterogeneity at the left aspect near the fundus.  This would technically be thickened if the patient is postmenopausal, for which further correlation would be advised.  To correlate with history. Nonspecific complex cystic area at the right ovary.  Overall appearance is nonspecific and could relate to hemorrhagic cyst with partial retracted clot.  Subsequent sonographic follow-up in 8-12 weeks can be obtained to ensure resolution.  - Referred to GYN multiple times for biopsy, no showed/cancelled each appt     Obesity:  - 24: 234lbs  - 25: 223lbs  - 3/12/25: 218lbs - Has been out of Zepbound on 1 month, believes it is on backorder  - Current regimen: Zepbound 7.5mg     Osteoarthritis:  - Injection 24. Reports that it has helped significantly.  - Multiple missed PT sessions     Sciatica (Right side):  - First documented on Epic  by Dr. Portillo  - Seen via Virtual visit 25, was prescribed Gabapentin, Methylprednisone, and Methocarbamol  - Reports that medications have made pain more bearable - able to get out of bed & move around, but pain is not totally gone. Denies bladder/bowel incontinence.  - Has PT appointment at [facility] tomorrow      Review of Systems negative except for symptoms mentioned in HPI      Gynecological History:  - Menstruation: Patient's last menstrual period was 2025.  - Mammogram:  - BIRADS 1 - Negative  - Pap smear:  - Sexual History:   -  Contraception: N/A    Obstetric History:     Health Maintenance         Date Due Completion Date    Cervical Cancer Screening Never done ---    TETANUS VACCINE Never done ---    Colorectal Cancer Screening Never done ---    Shingles Vaccine (1 of 2) Never done ---    Pneumococcal Vaccines (Age 50+) (1 of 1 - PCV) Never done ---    COVID-19 Vaccine (2024- season) 2024    Mammogram 2025 8/3/2024    Hemoglobin A1c (Diabetic Prevention Screening) 2028    Lipid Panel 2030    RSV Vaccine (Age 60+ and Pregnant patients) (1 - 1-dose 75+ series) 2048 ---            Past Medical History:   Diagnosis Date    Asthma        Past Surgical History:   Procedure Laterality Date    CHOLECYSTECTOMY      ESOPHAGOGASTRODUODENOSCOPY      ESOPHAGOGASTRODUODENOSCOPY N/A 2020    Procedure: EGD (ESOPHAGOGASTRODUODENOSCOPY);  Surgeon: Markel Duff MD;  Location: [facility];  Service: Endoscopy;  Laterality: N/A;    TUBAL LIGATION         Family History   Problem Relation Name Age of Onset    Breast cancer Mother Dejah Church 50    Cancer Mother Dejah Church     Diabetes Mother Dejah Church     Hypertension Father Abdirashid Church     Asthma Son Rojelio garcia        Social History     Socioeconomic History    Marital status:    Tobacco Use    Smoking status: Never    Smokeless tobacco: Never   Substance and Sexual Activity    Alcohol use: Never    Drug use: Never    Sexual activity: Yes     Partners: Male     Birth control/protection: Post-menopausal, Other-see comments     Comment: Tubes tied     Social Drivers of Health     Financial Resource Strain: Patient Declined (3/13/2024)    Overall Financial Resource Strain (CARDIA)     Difficulty of Paying Living Expenses: Patient declined   Food Insecurity: No Food Insecurity (3/13/2024)    Hunger Vital Sign     Worried About Running Out of Food in the Last Year: Never true     Ran Out of Food in the  Last Year: Never true   Transportation Needs: No Transportation Needs (3/13/2024)    PRAPARE - Transportation     Lack of Transportation (Medical): No     Lack of Transportation (Non-Medical): No   Physical Activity: Unknown (3/13/2024)    Exercise Vital Sign     Days of Exercise per Week: Patient declined   Stress: Stress Concern Present (3/13/2024)    [employer] [place] [employer] - Occupational Stress Questionnaire     Feeling of Stress : Very much   Housing Stability: Unknown (3/13/2024)    Housing Stability Vital Sign     Unable to Pay for Housing in the Last Year: Patient declined     Unstable Housing in the Last Year: No       Current Medications[1]    Review of patient's allergies indicates:   Allergen Reactions    Trazodone Swelling         Objective:     Vitals:    03/12/25 0828   BP: 111/80   Resp: 18       Wt Readings from Last 1 Encounters:   03/12/25 99.1 kg (218 lb 7.6 oz)       Physical Exam  Constitutional:       General: She is not in acute distress.  Eyes:      General:         Right eye: No discharge.         Left eye: No discharge.      Conjunctiva/sclera: Conjunctivae normal.   Cardiovascular:      Rate and Rhythm: Normal rate.   Pulmonary:      Effort: Pulmonary effort is normal. No respiratory distress.   Neurological:      Mental Status: She is alert and oriented to person, place, and time.      Gait: Gait normal.   Psychiatric:         Behavior: Behavior normal.         Assessment/Plan:     Pilar was seen today for follow-up.    Diagnoses and all orders for this visit:    Anxiety  Acute on chronic condition. Uncontrolled, likely secondary to inappropriate administration of SSRI (taking PRN). Encouraged daily use. Discussed potential side effects. Addressed triggers, barriers to doing the same fun activities as before. Will follow up in 1.5 months.  -     sertraline (ZOLOFT) 25 MG tablet; Take 1 tablet (25 mg total) by mouth once daily.    Abnormal uterine bleeding (AUB)  Chronic  condition. Uncontrolled. Reviewed prior labs - no need for hospitalization for transfusion at this time. Will obtain more updated US. Encouraged making Gyn appointment for potential biopsy.  -     US Pelvis Complete Non OB; Future    Obesity (BMI 30.0-34.9)  Chronic condition. Good weight loss. Will keep at current dose.  -     tirzepatide, weight loss, (ZEPBOUND) 7.5 mg/0.5 mL PnIj; Inject 7.5 mg into the skin every 7 days.      Follow up in about 4 weeks for mental health follow up.    Case discussed with staff: Dr. Leah Morris MD PGY-2  [facility]           [1]   Current Outpatient Medications   Medication Sig Dispense Refill    doxycycline (VIBRAMYCIN) 100 MG Cap       albuterol (ACCUNEB) 0.63 mg/3 mL Nebu albuterol sulfate Take No date recorded No form recorded No frequency recorded No route recorded No set duration recorded No set duration amount recorded active No dosage strength recorded No dosage strength units of measure recorded      azelastine (ASTELIN) 137 mcg (0.1 %) nasal spray 1 spray (137 mcg total) by Nasal route 2 (two) times daily. 30 mL 0    celecoxib (CELEBREX) 100 MG capsule Take 1 capsule (100 mg total) by mouth daily as needed for Pain. (Patient not taking: Reported on 2/24/2025) 30 capsule 0    gabapentin (NEURONTIN) 300 MG capsule Take 1 capsule (300 mg total) by mouth 2 (two) times daily. for 7 days 14 capsule 0    methocarbamoL (ROBAXIN) 750 MG Tab Take 1 tablet (750 mg total) by mouth 4 (four) times daily as needed (muscle pain). 40 tablet 0    sertraline (ZOLOFT) 25 MG tablet Take 1 tablet (25 mg total) by mouth once daily. 30 tablet 11    tirzepatide, weight loss, (ZEPBOUND) 7.5 mg/0.5 mL PnIj Inject 7.5 mg into the skin every 7 days. 2 mL 3     No current facility-administered medications for this visit.

## 2025-03-13 ENCOUNTER — HOSPITAL ENCOUNTER (OUTPATIENT)
Dept: RADIOLOGY | Facility: HOSPITAL | Age: 50
Discharge: HOME | End: 2025-03-13
Payer: MEDICAID

## 2025-03-13 DIAGNOSIS — N93.9 ABNORMAL UTERINE BLEEDING (AUB): ICD-10-CM

## 2025-03-13 PROCEDURE — 76830 TRANSVAGINAL US NON-OB: CPT | Mod: 26,,, | Performed by: RADIOLOGY

## 2025-03-13 PROCEDURE — 76856 US EXAM PELVIC COMPLETE: CPT | Mod: 26,,, | Performed by: RADIOLOGY

## 2025-03-13 PROCEDURE — 76856 US EXAM PELVIC COMPLETE: CPT | Mod: TC

## 2025-03-17 ENCOUNTER — PATIENT MESSAGE (OUTPATIENT)
Dept: FAMILY MEDICINE | Facility: HOSPITAL | Age: 50
End: 2025-03-17
Payer: MEDICAID

## 2025-03-19 ENCOUNTER — ON-DEMAND VIRTUAL (OUTPATIENT)
Dept: URGENT CARE | Facility: CLINIC | Age: 50
End: 2025-03-19
Payer: MEDICAID

## 2025-03-19 DIAGNOSIS — J06.9 UPPER RESPIRATORY TRACT INFECTION, UNSPECIFIED TYPE: Primary | ICD-10-CM

## 2025-03-19 RX ORDER — OSELTAMIVIR PHOSPHATE 75 MG/1
75 CAPSULE ORAL 2 TIMES DAILY
Qty: 10 CAPSULE | Refills: 0 | Status: SHIPPED | OUTPATIENT
Start: 2025-03-19 | End: 2025-03-24

## 2025-03-19 RX ORDER — PROMETHAZINE HYDROCHLORIDE AND DEXTROMETHORPHAN HYDROBROMIDE 6.25; 15 MG/5ML; MG/5ML
5 SYRUP ORAL EVERY 6 HOURS PRN
Qty: 100 ML | Refills: 0 | Status: SHIPPED | OUTPATIENT
Start: 2025-03-19 | End: 2025-03-29

## 2025-03-19 RX ORDER — IPRATROPIUM BROMIDE 21 UG/1
2 SPRAY, METERED NASAL 2 TIMES DAILY
Qty: 30 ML | Refills: 0 | Status: SHIPPED | OUTPATIENT
Start: 2025-03-19 | End: 2025-03-29

## 2025-03-19 NOTE — PROGRESS NOTES
Subjective:      Patient ID: Pilar Lester is a 49 y.o. female.    Vitals:  vitals were not taken for this visit.     Chief Complaint: Sinus Problem      Visit Type: TELE AUDIOVISUAL    Patient Location: Home     Present with the patient at the time of consultation: TELEMED PRESENT WITH PATIENT: None    Past Medical History:   Diagnosis Date    Asthma      Past Surgical History:   Procedure Laterality Date    CHOLECYSTECTOMY      ESOPHAGOGASTRODUODENOSCOPY      ESOPHAGOGASTRODUODENOSCOPY N/A 2020    Procedure: EGD (ESOPHAGOGASTRODUODENOSCOPY);  Surgeon: Markel Duff MD;  Location: [facility];  Service: Endoscopy;  Laterality: N/A;    TUBAL LIGATION       Review of patient's allergies indicates:   Allergen Reactions    Trazodone Swelling     Medications Ordered Prior to Encounter[1]  Family History   Problem Relation Name Age of Onset    Breast cancer Mother Dejah Church 50    Cancer Mother Dejah Church     Diabetes Mother Dejah Church     Hypertension Father Abdirashid Church     Asthma Son Rojelio garcia        Medications Ordered                [id number] - [place], [place] - [address]   [address], [place] [address]    Telephone: [phone]   Fax: [phone]   Hours: Not open 24 hours                         E-Prescribed (3 of 3)              ipratropium (ATROVENT) 21 mcg (0.03 %) nasal spray    Si sprays by Each Nostril route 2 (two) times daily. for 10 days       Start: 3/19/25     Quantity: 30 mL Refills: 0                         oseltamivir (TAMIFLU) 75 MG capsule    Sig: Take 1 capsule (75 mg total) by mouth 2 (two) times daily. for 5 days       Start: 3/19/25     Quantity: 10 capsule Refills: 0                         promethazine-dextromethorphan (PROMETHAZINE-DM) 6.25-15 mg/5 mL Syrp    Sig: Take 5 mLs by mouth every 6 (six) hours as needed (cough).       Start: 3/19/25     Quantity: 100 mL Refills: 0                           Ohs Peq Odvv Intake    3/19/2025 11:17 AM CDT  - Filed by Patient   What is your current physical address in the event of a medical emergency?    Are you able to take your vital signs? No   Please attach any relevant images or files    Is your employer contracted with [facility]? No         Sinus Problem  This is a new problem. The current episode started yesterday. The problem is unchanged. There has been no fever. Associated symptoms include congestion, coughing, headaches, sinus pressure, sneezing and a sore throat. Pertinent negatives include no diaphoresis, ear pain, hoarse voice, neck pain, shortness of breath or swollen glands. Past treatments include acetaminophen. The treatment provided mild relief.   Exposure to flu    Constitution: Negative for sweating.   HENT:  Positive for congestion, sinus pressure and sore throat. Negative for ear pain.    Neck: Negative for neck pain.   Respiratory:  Positive for cough. Negative for shortness of breath.    Allergic/Immunologic: Positive for sneezing.   Neurological:  Positive for headaches.        Objective:   The physical exam was conducted virtually.  Physical Exam   HENT:   Nose: Rhinorrhea and congestion present.   Abdominal: Normal appearance.   Neurological: She is alert.       Assessment:     1. Upper respiratory tract infection, unspecified type        Plan:       Upper respiratory tract infection, unspecified type  -     promethazine-dextromethorphan (PROMETHAZINE-DM) 6.25-15 mg/5 mL Syrp; Take 5 mLs by mouth every 6 (six) hours as needed (cough).  Dispense: 100 mL; Refill: 0    Other orders  -     oseltamivir (TAMIFLU) 75 MG capsule; Take 1 capsule (75 mg total) by mouth 2 (two) times daily. for 5 days  Dispense: 10 capsule; Refill: 0  -     ipratropium (ATROVENT) 21 mcg (0.03 %) nasal spray; 2 sprays by Each Nostril route 2 (two) times daily. for 10 days  Dispense: 30 mL; Refill: 0                        [1]   Current Outpatient Medications on File Prior to Visit   Medication Sig Dispense  Refill    albuterol (ACCUNEB) 0.63 mg/3 mL Nebu albuterol sulfate Take No date recorded No form recorded No frequency recorded No route recorded No set duration recorded No set duration amount recorded active No dosage strength recorded No dosage strength units of measure recorded      azelastine (ASTELIN) 137 mcg (0.1 %) nasal spray 1 spray (137 mcg total) by Nasal route 2 (two) times daily. 30 mL 0    celecoxib (CELEBREX) 100 MG capsule Take 1 capsule (100 mg total) by mouth daily as needed for Pain. (Patient not taking: Reported on 2/24/2025) 30 capsule 0    doxycycline (VIBRAMYCIN) 100 MG Cap       gabapentin (NEURONTIN) 300 MG capsule Take 1 capsule (300 mg total) by mouth 2 (two) times daily. for 7 days 14 capsule 0    methocarbamoL (ROBAXIN) 750 MG Tab Take 1 tablet (750 mg total) by mouth 4 (four) times daily as needed (muscle pain). 40 tablet 0    sertraline (ZOLOFT) 25 MG tablet Take 1 tablet (25 mg total) by mouth once daily. 30 tablet 11    tirzepatide, weight loss, (ZEPBOUND) 7.5 mg/0.5 mL PnIj Inject 7.5 mg into the skin every 7 days. 2 mL 3     No current facility-administered medications on file prior to visit.

## 2025-04-01 ENCOUNTER — PATIENT MESSAGE (OUTPATIENT)
Dept: FAMILY MEDICINE | Facility: HOSPITAL | Age: 50
End: 2025-04-01
Payer: MEDICAID

## 2025-04-02 ENCOUNTER — OFFICE VISIT (OUTPATIENT)
Dept: FAMILY MEDICINE | Facility: HOSPITAL | Age: 50
End: 2025-04-02
Payer: MEDICAID

## 2025-04-02 DIAGNOSIS — G93.31 POSTVIRAL FATIGUE SYNDROME: Primary | ICD-10-CM

## 2025-04-02 DIAGNOSIS — D64.9 ANEMIA, UNSPECIFIED TYPE: ICD-10-CM

## 2025-04-02 NOTE — PROGRESS NOTES
*This is a telephone encounter that is replacing a normal clinic visit. Due to COVID-19, we are limiting non-urgent clinic visits and I will address patient's concerns to the best of my ability via telephone. If I feel a clinic visit is necessary, proper arrangements will be made to do so.     Pilar Lester is a 49 y.o. female who attended a televisit with concerns regarding persistent postviral fatigue.    Post-viral fatigue: 2 weeks ago, granddaughters had flu. Patient started having cough a week ago. Got flu shot. Is now using heater & blanket at work. Denies fevers. Patient had virtual Urgent Care visit and was prescribed Tamiflu. Did not get respiratory swabs.    Review of Systems   HENT:  Negative for hearing loss.    Eyes:  Negative for discharge.   Respiratory:  Negative for wheezing.    Cardiovascular:  Negative for chest pain and palpitations.   Gastrointestinal:  Negative for blood in stool, constipation, diarrhea and vomiting.   Genitourinary:  Negative for dysuria and hematuria.   Musculoskeletal:  Negative for neck pain.   Neurological:  Positive for weakness and headaches.   Endo/Heme/Allergies:  Negative for polydipsia.       Assessment/Plan:    Diagnoses and all orders for this visit:    Postviral fatigue syndrome  Chronic condition. Patient never received swabs at Urgent Care; could also be COVID. Discussed hydration & rest. Discussed ER precautions including inability to tolerate PO, dizziness, etc.    Anemia, unspecified type  Chronic condition. Likely iron deficiency anemia; patient would like to be sure. Iron panel & B12 ordered.  -     Iron and TIBC; Future  -     Ferritin; Future  -     Vitamin B12; Future        Patient instructed to contact the clinic at [phone] with any additional questions or concerns.    Case discussed with staff.      France Morris MD   [facility]   04/02/2025 8:08 AM    Audio Only Telehealth Visit  The patient location is: Home  The chief complaint  leading to consultation is: Postviral fatigue  Visit type: Virtual visit with audio only due to patient choice  Total time spent with patient: 20 minutes  Each patient to whom I provide medical services by telemedicine is:  (1) informed of the relationship between the physician and patient and the respective role of any other health care provider with respect to management of the patient; and (2) notified that they may decline to receive medical services by telemedicine and may withdraw from such care at any time. Patient verbally consented to receive this service via voice-only telephone call.     This service was not originating from a related E/M service provided within the previous 7 days nor will  to an E/M service or procedure within the next 24 hours or my soonest available appointment.  Prevailing standard of care was able to be met in this audio-only visit.       Answers submitted by the patient for this visit:  Review of Systems Questionnaire (Submitted on 4/2/2025)  activity change: No  unexpected weight change: No  rhinorrhea: No  trouble swallowing: No  visual disturbance: No  chest tightness: No  polyuria: No  difficulty urinating: No  menstrual problem: No  joint swelling: No  arthralgias: No  confusion: No  dysphoric mood: No

## 2025-04-17 ENCOUNTER — OFFICE VISIT (OUTPATIENT)
Dept: FAMILY MEDICINE | Facility: HOSPITAL | Age: 50
End: 2025-04-17
Payer: MEDICAID

## 2025-04-17 DIAGNOSIS — E66.811 CLASS 1 OBESITY WITHOUT SERIOUS COMORBIDITY WITH BODY MASS INDEX (BMI) OF 33.0 TO 33.9 IN ADULT, UNSPECIFIED OBESITY TYPE: Primary | ICD-10-CM

## 2025-04-17 DIAGNOSIS — F41.9 ANXIETY: ICD-10-CM

## 2025-04-17 RX ORDER — TIRZEPATIDE 7.5 MG/.5ML
7.5 INJECTION, SOLUTION SUBCUTANEOUS
Qty: 0.5 ML | Refills: 6 | Status: SHIPPED | OUTPATIENT
Start: 2025-04-17

## 2025-04-17 NOTE — PROGRESS NOTES
"*This is a telephone encounter that is replacing a normal clinic visit. Due to COVID-19, we are limiting non-urgent clinic visits and I will address patient's concerns to the best of my ability via telephone. If I feel a clinic visit is necessary, proper arrangements will be made to do so.     Pilar Lester is a 49 y.o. female who attended a televisit with concerns regarding the following:    PMHx: AUB, Obesity, Osteoarthritis, Sciatica     Anxiety:  - GAD7 3/12/25: 14 (Moderate)  - 3/2025: Was taking her old,  Sertraline PRN. Has pill bottle in bag - 1 year . Switched to daily.  - 2025: Sent message a few weeks back about nausea. Was advised to cut to hald dose. Reports she is still taking half dose. Reports "somewhat" improvement of anxiety.  - Current regimen: Sertraline 12.5mg     AUB:  - 3/2024: Saw Dr. Cowan for 2-day hx of light pink vaginal spotting. LMP 2023.  - US 3/18/24: Posterior uterine body fibroid. Endometrial stripe thickness of 8 mm with nonspecific heterogeneity at the left aspect near the fundus.  This would technically be thickened if the patient is postmenopausal, for which further correlation would be advised.  To correlate with history. Nonspecific complex cystic area at the right ovary.  Overall appearance is nonspecific and could relate to hemorrhagic cyst with partial retracted clot.  Subsequent sonographic follow-up in 8-12 weeks can be obtained to ensure resolution.  - Referred to GYN multiple times for biopsy, no showed/cancelled each appt     Obesity:  - 24: 234lbs  - 25: 223lbs  - 3/12/25: 218lbs - Has been out of Zepbound on 1 month, believes it is on backorder  - 25: 205lbs (reported)  - Current regimen: Zepbound 7.5mg     Osteoarthritis:  - Injection 24. Reports that it has helped significantly.  - Multiple missed PT sessions     Sciatica (Right side):  - First documented on Epic  by Dr. Portillo  - Seen via Virtual visit " 2/16/25, was prescribed Gabapentin, Methylprednisone, and Methocarbamol  - Reports that medications have made pain more bearable - able to get out of bed & move around, but pain is not totally gone. Denies bladder/bowel incontinence.  - Has PT appointment at [place] [facility] tomorrow    Review of Systems negative except for symptoms mentioned in HPI     Assessment/Plan:    Diagnoses and all orders for this visit:    Class 1 obesity without serious comorbidity with body mass index (BMI) of 33.0 to 33.9 in adult, unspecified obesity type  Chronic condition. Uncontrolled but with significant improvement at current dose. Will change to vial formulation for cost purposes.  -     tirzepatide, weight loss, (ZEPBOUND) 7.5 mg/0.5 mL PnIj; Inject 7.5 mg into the skin every 7 days.    Anxiety  Chronic condition. Uncontrolled but improved. Discussed titration up to full 25mg dose. Will follow up at next visit.      Patient instructed to contact the clinic at [phone] with any additional questions or concerns.    Case discussed with staff.      France Morris MD   \[facility] [place] [facility]\"" Family Medicine   04/17/2025 8:50 AM    Audio Only Telehealth Visit  The patient location is: Home  The chief complaint leading to consultation is: Obesity & Anxiety followup  Visit type: Virtual visit with audio only due to patient choice  Total time spent with patient: 30 minutes  Each patient to whom I provide medical services by telemedicine is:  (1) informed of the relationship between the physician and patient and the respective role of any other health care provider with respect to management of the patient; and (2) notified that they may decline to receive medical services by telemedicine and may withdraw from such care at any time. Patient verbally consented to receive this service via voice-only telephone call.     This service was not originating from a related E/M service provided within the previous 7 days nor will  to an E/M service or procedure  within the next 24 hours or my soonest available appointment.  Prevailing standard of care was able to be met in this audio-only visit.

## 2025-05-14 DIAGNOSIS — E66.811 CLASS 1 OBESITY WITHOUT SERIOUS COMORBIDITY WITH BODY MASS INDEX (BMI) OF 33.0 TO 33.9 IN ADULT, UNSPECIFIED OBESITY TYPE: ICD-10-CM

## 2025-05-19 ENCOUNTER — PATIENT MESSAGE (OUTPATIENT)
Dept: FAMILY MEDICINE | Facility: HOSPITAL | Age: 50
End: 2025-05-19
Payer: MEDICAID

## 2025-05-19 DIAGNOSIS — E66.811 CLASS 1 OBESITY WITHOUT SERIOUS COMORBIDITY WITH BODY MASS INDEX (BMI) OF 33.0 TO 33.9 IN ADULT, UNSPECIFIED OBESITY TYPE: ICD-10-CM

## 2025-05-19 RX ORDER — TIRZEPATIDE 7.5 MG/.5ML
7.5 INJECTION, SOLUTION SUBCUTANEOUS
Qty: 0.5 ML | Refills: 6 | Status: SHIPPED | OUTPATIENT
Start: 2025-05-19 | End: 2025-05-19 | Stop reason: SDUPTHER

## 2025-05-19 RX ORDER — TIRZEPATIDE 7.5 MG/.5ML
7.5 INJECTION, SOLUTION SUBCUTANEOUS
Qty: 0.5 ML | Refills: 6 | Status: SHIPPED | OUTPATIENT
Start: 2025-05-19 | End: 2025-05-24 | Stop reason: SDUPTHER

## 2025-05-22 DIAGNOSIS — E66.811 CLASS 1 OBESITY WITHOUT SERIOUS COMORBIDITY WITH BODY MASS INDEX (BMI) OF 33.0 TO 33.9 IN ADULT, UNSPECIFIED OBESITY TYPE: ICD-10-CM

## 2025-05-22 RX ORDER — TIRZEPATIDE 7.5 MG/.5ML
7.5 INJECTION, SOLUTION SUBCUTANEOUS
Qty: 0.5 ML | Refills: 6 | OUTPATIENT
Start: 2025-05-22

## 2025-05-23 ENCOUNTER — PATIENT MESSAGE (OUTPATIENT)
Dept: FAMILY MEDICINE | Facility: HOSPITAL | Age: 50
End: 2025-05-23
Payer: MEDICAID

## 2025-05-24 DIAGNOSIS — E66.811 CLASS 1 OBESITY WITHOUT SERIOUS COMORBIDITY WITH BODY MASS INDEX (BMI) OF 33.0 TO 33.9 IN ADULT, UNSPECIFIED OBESITY TYPE: ICD-10-CM

## 2025-05-24 RX ORDER — TIRZEPATIDE 7.5 MG/.5ML
7.5 INJECTION, SOLUTION SUBCUTANEOUS
Qty: 2 ML | Refills: 6 | Status: SHIPPED | OUTPATIENT
Start: 2025-05-24

## 2025-05-27 ENCOUNTER — OFFICE VISIT (OUTPATIENT)
Dept: URGENT CARE | Facility: CLINIC | Age: 50
End: 2025-05-27
Payer: MEDICAID

## 2025-05-27 VITALS
HEART RATE: 84 BPM | OXYGEN SATURATION: 98 % | SYSTOLIC BLOOD PRESSURE: 130 MMHG | BODY MASS INDEX: 33.04 KG/M2 | HEIGHT: 68 IN | WEIGHT: 218 LBS | TEMPERATURE: 98 F | DIASTOLIC BLOOD PRESSURE: 70 MMHG | RESPIRATION RATE: 20 BRPM

## 2025-05-27 DIAGNOSIS — M25.562 LEFT KNEE PAIN, UNSPECIFIED CHRONICITY: Primary | ICD-10-CM

## 2025-05-27 DIAGNOSIS — M17.12 ARTHRITIS OF LEFT KNEE: ICD-10-CM

## 2025-05-27 PROCEDURE — 73562 X-RAY EXAM OF KNEE 3: CPT | Mod: FY,LT,S$GLB, | Performed by: RADIOLOGY

## 2025-05-27 PROCEDURE — 99213 OFFICE O/P EST LOW 20 MIN: CPT | Mod: S$GLB,,,

## 2025-05-27 RX ORDER — KETOROLAC TROMETHAMINE 10 MG/1
10 TABLET, FILM COATED ORAL EVERY 6 HOURS
Qty: 20 TABLET | Refills: 0 | Status: SHIPPED | OUTPATIENT
Start: 2025-05-27 | End: 2025-06-01

## 2025-05-27 RX ORDER — DICLOFENAC SODIUM 10 MG/G
2 GEL TOPICAL 4 TIMES DAILY
Qty: 20 G | Refills: 0 | Status: SHIPPED | OUTPATIENT
Start: 2025-05-27 | End: 2025-06-03

## 2025-05-27 RX ORDER — KETOROLAC TROMETHAMINE 30 MG/ML
30 INJECTION, SOLUTION INTRAMUSCULAR; INTRAVENOUS
Status: COMPLETED | OUTPATIENT
Start: 2025-05-27 | End: 2025-05-27

## 2025-05-27 RX ADMIN — KETOROLAC TROMETHAMINE 30 MG: 30 INJECTION, SOLUTION INTRAMUSCULAR; INTRAVENOUS at 10:05

## 2025-05-27 NOTE — PROGRESS NOTES
"Subjective:      Patient ID: Pilar Lester is a 49 y.o. female.    Vitals:  height is 5' 8" (1.727 m) and weight is 98.9 kg (218 lb). Her oral temperature is 98 °F (36.7 °C). Her blood pressure is 130/70 and her pulse is 84. Her respiration is 20 and oxygen saturation is 98%.     Chief Complaint: Knee Pain    Pt is a 49 y.o. female with PMHx of osteoarthritis. She is presenting with L knee pain.  Onset of symptoms was a few days ago, but worsened yesterday after trying to run. Denies any known injury or trauma, but has been working more frequently. Pt reports using OTC NSAIDs without relief.    Knee Pain   The incident occurred 2 days ago.       Constitution: Negative for activity change, appetite change, chills and fever.   HENT:  Negative for ear pain, congestion, postnasal drip, sinus pain, sinus pressure and sore throat.    Neck: Negative for neck pain.   Cardiovascular:  Negative for chest pain and sob on exertion.   Eyes:  Negative for eye trauma, eye discharge, eye itching, eye redness, photophobia and blurred vision.   Respiratory:  Negative for cough, shortness of breath, wheezing and asthma.    Gastrointestinal:  Negative for abdominal pain, nausea, vomiting, constipation and diarrhea.   Genitourinary:  Negative for dysuria, frequency, urgency, urine decreased and hematuria.   Musculoskeletal:  Positive for pain and joint pain. Negative for trauma and muscle ache.   Skin:  Negative for color change, rash and hives.   Allergic/Immunologic: Negative for seasonal allergies, asthma, hives and sneezing.   Neurological:  Negative for dizziness, light-headedness, headaches and altered mental status.   Psychiatric/Behavioral:  Negative for altered mental status and confusion.       Objective:     Physical Exam   Constitutional: She is oriented to person, place, and time. She appears well-developed. She is cooperative.      Comments:Pt sitting erect on examination table. No acute respiratory distress, no use of " accessory muscles, no notice of nasal flaring. Wearing black knee brace.     HENT:   Head: Normocephalic and atraumatic.   Ears:   Right Ear: Hearing and external ear normal.   Left Ear: Hearing and external ear normal.   Nose: Nose normal. No mucosal edema or nasal deformity. No epistaxis. Right sinus exhibits no maxillary sinus tenderness and no frontal sinus tenderness. Left sinus exhibits no maxillary sinus tenderness and no frontal sinus tenderness.   Mouth/Throat: Uvula is midline, oropharynx is clear and moist and mucous membranes are normal. No trismus in the jaw. Normal dentition. No uvula swelling.   Eyes: Conjunctivae and lids are normal.   Neck: Trachea normal and phonation normal. Neck supple.   Cardiovascular: Normal rate, regular rhythm, normal heart sounds and normal pulses.   Pulmonary/Chest: Effort normal.   Abdominal: Normal appearance.   Musculoskeletal: Normal range of motion.         General: Normal range of motion.      Left knee: Tenderness found.        Legs:       Comments: Tenderness of L medial knee   No swelling, redness, discoloration, or deformity noted on exam  KAYLYN    Neurological: She is alert and oriented to person, place, and time. She exhibits normal muscle tone.   Skin: Skin is warm, dry and intact.   Psychiatric: Her speech is normal and behavior is normal. Judgment and thought content normal.   Nursing note and vitals reviewed.    XR KNEE 3 VIEW LEFT  Result Date: 5/27/2025  EXAMINATION: XR KNEE 3 VIEW LEFT CLINICAL HISTORY: Pain in left knee TECHNIQUE: AP, lateral, and Merchant views of the left knee were performed. COMPARISON: None FINDINGS: No convincing evidence of acute fracture or dislocation.  Joint spaces appear maintained.  Mild degenerative change about the knee joint.  Minimal enthesopathic change at the patella and tibial tubercle. No large joint effusion.  No evidence of radiopaque foreign body.  No acute findings involving the partially imaged right knee.     No  convincing evidence of acute fracture or dislocation. Electronically signed by: Jake Lundberg Date:    05/27/2025 Time:    10:57      Assessment:     1. Left knee pain, unspecified chronicity    2. Arthritis of left knee        Plan:   I have reviewed the patient chart and pertinent past imaging/labs.      Left knee pain, unspecified chronicity  -     XR KNEE 3 VIEW LEFT; Future; Expected date: 05/27/2025  -     diclofenac sodium (VOLTAREN) 1 % Gel; Apply 2 g topically 4 (four) times daily. for 7 days  Dispense: 20 g; Refill: 0  -     BANDAGE ELASTIC 6IN ACE    Arthritis of left knee  -     ketorolac injection 30 mg  -     ketorolac (TORADOL) 10 mg tablet; Take 1 tablet (10 mg total) by mouth every 6 (six) hours. for 5 days  Dispense: 20 tablet; Refill: 0

## 2025-05-27 NOTE — PATIENT INSTRUCTIONS
Wear ace wrap  while mobile   Knee Pain: Voltaren gel 3-4 times daily as needed  Pain: Toradol every 6 hours as needed. May alternate with tylenol as needed. Do not take with any other NSAIDs (Ibuprofen, Advil, Aleve, Naproxen, etc)  Please drink plenty of fluids.  Please get plenty of rest.  Please return here or go to the Emergency Department for any concerns or worsening of condition.  If you were prescribed a narcotic medication, do not drive or operate heavy equipment or machinery while taking these medications.  If you were not prescribed an anti-inflammatory medication, and if you do not have any history of stomach/intestinal ulcers, or kidney disease, or are not taking a blood thinner such as Coumadin, Plavix, Pradaxa, Eloquis, or Xaralta for example, it is OK to take over the counter Ibuprofen or Advil or Motrin or Aleve as directed.  Do not take these medications on an empty stomach.  Rest, ice, compression and elevation to the affected joint or limb as needed.  Please follow up with your primary care doctor or specialist as needed.    If you  smoke, please stop smoking.

## 2025-06-10 ENCOUNTER — PATIENT MESSAGE (OUTPATIENT)
Dept: FAMILY MEDICINE | Facility: HOSPITAL | Age: 50
End: 2025-06-10
Payer: MEDICAID

## 2025-06-28 ENCOUNTER — ON-DEMAND VIRTUAL (OUTPATIENT)
Dept: URGENT CARE | Facility: CLINIC | Age: 50
End: 2025-06-28
Payer: MEDICAID

## 2025-06-28 DIAGNOSIS — R30.0 DYSURIA: Primary | ICD-10-CM

## 2025-06-28 PROCEDURE — 98005 SYNCH AUDIO-VIDEO EST LOW 20: CPT | Mod: 95,,, | Performed by: NURSE PRACTITIONER

## 2025-06-28 RX ORDER — SULFAMETHOXAZOLE AND TRIMETHOPRIM 800; 160 MG/1; MG/1
1 TABLET ORAL 2 TIMES DAILY
Qty: 6 TABLET | Refills: 0 | Status: SHIPPED | OUTPATIENT
Start: 2025-06-28 | End: 2025-07-01

## 2025-06-28 NOTE — PROGRESS NOTES
Subjective:      Patient ID: Pilar Lester is a 49 y.o. female.    Vitals:  vitals were not taken for this visit.     Chief Complaint: Dysuria      Visit Type: TELE AUDIOVISUAL - This visit was conducted virtually based on  subjective information and limited objective exam    Present with the patient at the time of consultation: TELEMED PRESENT WITH PATIENT: None  LOCATION OF PATIENT [facility]  Two patient identifiers used to verify patient- saying out date of birth and full name.       Past Medical History:   Diagnosis Date    Asthma      Past Surgical History:   Procedure Laterality Date    CHOLECYSTECTOMY      ESOPHAGOGASTRODUODENOSCOPY      ESOPHAGOGASTRODUODENOSCOPY N/A 12/18/2020    Procedure: EGD (ESOPHAGOGASTRODUODENOSCOPY);  Surgeon: Markel Duff MD;  Location: [facility];  Service: Endoscopy;  Laterality: N/A;    TUBAL LIGATION       Review of patient's allergies indicates:   Allergen Reactions    Trazodone Swelling     Medications Ordered Prior to Encounter[1]  Family History   Problem Relation Name Age of Onset    Breast cancer Mother Dejah Church 50    Cancer Mother Dejah Church     Diabetes Mother Dejah Church     Hypertension Father Abdirashid Church     Asthma Son Rojelio agrcia        Medications Ordered                [id number] - [place], [place] - [address]   [id number], [place] [address]    Telephone: [phone]   Fax: [phone]   Hours: Not open 24 hours                         E-Prescribed (1 of 1)              sulfamethoxazole-trimethoprim 800-160mg (BACTRIM DS) 800-160 mg Tab    Sig: Take 1 tablet by mouth 2 (two) times daily. for 3 days       Start: 6/28/25     Quantity: 6 tablet Refills: 0                           Ohs Peq Odvv Intake    6/28/2025  9:58 AM CDT - Filed by Patient   What is your current physical address in the event of a medical emergency? [address] [place] [address]   Are you able to take your vital signs? No   Please attach any relevant images or  files    Is your employer contracted with [facility]? No         48 yo female with c/o pressure and frequency and uncomfortable feeling when urinating. She states symptoms started a couple of days ago and she has tried drinking more water. She states she also thought might have been bubble baths. She states she took azo yesterday. She states still bothering her. She denies hematuria. She denies abdominal pain. She states lower abdomen pressure. She denies concern for std. She denies pregnancy.         Constitution: Negative. Negative for fever.   HENT: Negative.     Neck: neck negative.   Cardiovascular: Negative.    Respiratory: Negative.     Gastrointestinal: Negative.  Negative for abdominal pain, nausea and vomiting.   Endocrine: negative.   Genitourinary:  Positive for dysuria, frequency and urgency. Negative for vaginal discharge, vaginal odor and genital sore.   Musculoskeletal: Negative.  Negative for back pain.   Skin: Negative.    Neurological: Negative.         Objective:   The physical exam was conducted virtually.    AAO x 3 ; no acute distress noted; appearance normal; mood and behavior normal; thought process normal  Head- normocephalic  Nose- appears normal, no discharge or erythema  Eyes- pupils appear normal in size, no drainage, no erythema  Ears- normal appearing; no discharge, no erythema  Mouth- appears normal  Oropharynx- no erythema, lesions  Lungs- breathing at a normal rate, no acute distress noted  Heart- no reports of tachycardia, palpitations, chest pain  Abdomen- non distended, non tender reported by patient  Skin- warm and dry, no erythema or edema noted by patient or visualized  Psych- as above; no si/hi      Assessment:     1. Dysuria        Plan:     PLEASE READ YOUR DISCHARGE INSTRUCTIONS ENTIRELY AS IT CONTAINS IMPORTANT INFORMATION.      Take the antibiotics to completion.     Drink plenty of fluids, wipe front to back, take showers not baths, no scented soaps, wear  breathable cotton underwear, urinate after sexual intercourse.     Please go to Urgent Care or the ER for worsening symptoms including fever, worsening flank pain, vomiting, etc.       Please return or see your primary care doctor if you develop new or worsening symptoms.     Please arrange follow up with your primary medical clinic as soon as possible. You must understand that you've received an Urgent Care treatment only and that you may be released before all of your medical problems are known or treated. You, the patient, will arrange for follow up as instructed. If your symptoms worsen or fail to improve you should go to the Emergency Room.  WE CANNOT RULE OUT ALL POSSIBLE CAUSES OF YOUR SYMPTOMS IN THE URGENT CARE SETTING PLEASE GO TO THE ER IF YOU FEELS YOUR CONDITION IS WORSENING OR YOU WOULD LIKE EMERGENT EVALUATION.      Thank you for choosing [facility]!    Our goal in the [facility] is to always provide outstanding medical care. You may receive a survey by mail or e-mail in the next week regarding your experience today. We would greatly appreciate you completing and returning the survey. Your feedback provides us with a way to recognize our staff who provide very good care, and it helps us learn how to improve when your experience was below our aspiration of excellence.         We appreciate you trusting us with your medical care. We hope you feel better soon. We will be happy to take care of you for all of your future medical needs.    You must understand that you've received an Urgent Care treatment only and that you may be released before all your medical problems are known or treated. You, the patient, will arrange for follow up care as instructed.    Follow up with your PCP or specialty clinic as directed in the next 1-2 weeks if not improved or as needed.  You can call [phone] to schedule an appointment with the appropriate provider.    If your condition  worsens we recommend that you receive another evaluation in person, with your primary care provider, urgent care or at the emergency room immediately or contact your primary medical clinics after hours call service to discuss your concerns.         Dysuria  -     sulfamethoxazole-trimethoprim 800-160mg (BACTRIM DS) 800-160 mg Tab; Take 1 tablet by mouth 2 (two) times daily. for 3 days  Dispense: 6 tablet; Refill: 0                         [1]   Current Outpatient Medications on File Prior to Visit   Medication Sig Dispense Refill    albuterol (ACCUNEB) 0.63 mg/3 mL Nebu albuterol sulfate Take No date recorded No form recorded No frequency recorded No route recorded No set duration recorded No set duration amount recorded active No dosage strength recorded No dosage strength units of measure recorded      azelastine (ASTELIN) 137 mcg (0.1 %) nasal spray 1 spray (137 mcg total) by Nasal route 2 (two) times daily. 30 mL 0    celecoxib (CELEBREX) 100 MG capsule Take 1 capsule (100 mg total) by mouth daily as needed for Pain. (Patient not taking: Reported on 12/8/2023) 30 capsule 0    diclofenac sodium (VOLTAREN) 1 % Gel Apply 2 g topically 4 (four) times daily. for 7 days 20 g 0    doxycycline (VIBRAMYCIN) 100 MG Cap       gabapentin (NEURONTIN) 300 MG capsule Take 1 capsule (300 mg total) by mouth 2 (two) times daily. for 7 days 14 capsule 0    methocarbamoL (ROBAXIN) 750 MG Tab Take 1 tablet (750 mg total) by mouth 4 (four) times daily as needed (muscle pain). 40 tablet 0    sertraline (ZOLOFT) 25 MG tablet Take 1 tablet (25 mg total) by mouth once daily. 30 tablet 11    tirzepatide, weight loss, (ZEPBOUND) 7.5 mg/0.5 mL PnIj Inject 7.5 mg into the skin every 7 days. 2 mL 6     No current facility-administered medications on file prior to visit.

## 2025-07-17 ENCOUNTER — PATIENT MESSAGE (OUTPATIENT)
Dept: FAMILY MEDICINE | Facility: HOSPITAL | Age: 50
End: 2025-07-17
Payer: MEDICAID

## 2025-08-01 DIAGNOSIS — E66.811 CLASS 1 OBESITY WITHOUT SERIOUS COMORBIDITY WITH BODY MASS INDEX (BMI) OF 33.0 TO 33.9 IN ADULT, UNSPECIFIED OBESITY TYPE: Primary | ICD-10-CM

## 2025-08-04 ENCOUNTER — OCHSNER VIRTUAL EMERGENCY DEPARTMENT (OUTPATIENT)
Facility: CLINIC | Age: 50
End: 2025-08-04
Payer: MEDICAID

## 2025-08-04 ENCOUNTER — PATIENT OUTREACH (OUTPATIENT)
Facility: OTHER | Age: 50
End: 2025-08-04
Payer: MEDICAID

## 2025-08-04 ENCOUNTER — NURSE TRIAGE (OUTPATIENT)
Dept: ADMINISTRATIVE | Facility: CLINIC | Age: 50
End: 2025-08-04
Payer: MEDICAID

## 2025-08-04 DIAGNOSIS — N92.6 IRREGULAR MENSTRUAL BLEEDING: Primary | ICD-10-CM

## 2025-08-04 NOTE — PROGRESS NOTES
[facility] RN on call nurse consulted with [facility] MD on call, Dr. Hermann Mayer, and disposition is primary care. OOC nurse scheduled first available appointment on 8/14/25. Appointment reminder scheduled on 8/12/25. Follow up scheduled on 8/6/25 to outreach to assess for any additional needs/concerns.

## 2025-08-04 NOTE — PLAN OF CARE-OVED
[facility] Plan of Care Note  Referral Source: Nurse On-Call                               Chief Complaint   Patient presents with    Vaginal Bleeding       Patient is a 49-year-old female complaining of irregular vaginal bleeding, thinks she is going through perimenopause. Her menstrual cycle started on 7/18 it lasted for about 4 days and she has been spotting every day since. The 24th the spotting started getting heavier and then the 26th she started bleeding again like a regular period and has been bleeding like this ever since. Bleeding has continued to get heavier with clots. Denies any abdominal pain but is having fatigue.       Recommendation: Primary Care               Advised either PCP or gyn appointment.  Patient preferred PCP            Recommendation comment: Soonest appt was 8/14/25    Encounter Diagnosis   Name Primary?    Irregular menstrual bleeding Yes

## 2025-08-04 NOTE — TELEPHONE ENCOUNTER
Pt calling in, thinks she is going through perimenopause. Her menstrual cycle started on 7/18 it lasted for about 4 days and she has been spotting every day since. The 24th the spotting started getting heavier and then the 26th she started bleeding again like a regular period and has been bleeding like this ever since. Bleeding has continued to get heavier with clots. Denies any abdominal pain but is having fatigue. Pt would like to be seen by her pcp. No appts available until 8/14. Sent to [facility]. Per Dr. Dr. Moreno noguera to schedule appt for 8/14 since soonest GYN appt not until 8/18. Pt would also like message sent to PCP about medication Zepbound, she would like it increased to higher dosage. Advised will route encounter to PCP. Pt verbalized understanding. Advised to call back with further concerns.    Reason for Disposition   Patient wants to be seen    Additional Information   Negative: SEVERE vaginal bleeding (e.g., continuous red blood from vagina, or large blood clots) and very weak (can't stand)   Negative: Passed out (e.g., fainted, lost consciousness, blacked out and was not responding)   Negative: Difficult to awaken or acting confused (e.g., disoriented, slurred speech)   Negative: Shock suspected (e.g., cold/pale/clammy skin, too weak to stand, low BP, rapid pulse)   Negative: Sounds like a life-threatening emergency to the triager   Negative: SEVERE abdominal pain (e.g., excruciating)   Negative: SEVERE dizziness (e.g., unable to stand, requires support to walk, feels like passing out now)   Negative: SEVERE vaginal bleeding (e.g., soaking 2 pads or tampons per hour and present 2 or more hours; 1 menstrual cup every 2 hours)   Negative: Patient sounds very sick or weak to the triager   Negative: MODERATE vaginal bleeding (i.e., soaking pad or tampon per hour and present > 6 hours; 1 menstrual cup every 6 hours)   Negative: Constant abdominal pain lasting > 2 hours   Negative: Pale skin (pallor) of  new-onset or getting worse   Negative: Taking Coumadin (warfarin) or other strong blood thinner, or known bleeding disorder (e.g., thrombocytopenia)   Negative: Skin bruises or nosebleed and not caused by an injury    Protocols used: Vaginal Bleeding - [id number]

## 2025-08-06 ENCOUNTER — PATIENT OUTREACH (OUTPATIENT)
Facility: OTHER | Age: 50
End: 2025-08-06
Payer: MEDICAID

## 2025-08-06 NOTE — PROGRESS NOTES
Attempted to follow-up with pt today, after speaking with [facility] line and consulting [facility] on 8/4/25, but received no answer. Pt was left a VM and was asked to return the call. Next follow-up scheduled for 8/12/25 to address needs/concerns pt may have, as well as remind her of her 8/14 appt.    Laura Li  ED Navigator  [phone]

## 2025-08-07 ENCOUNTER — ON-DEMAND VIRTUAL (OUTPATIENT)
Dept: URGENT CARE | Facility: CLINIC | Age: 50
End: 2025-08-07
Payer: MEDICAID

## 2025-08-07 DIAGNOSIS — N92.1 MENORRHAGIA WITH IRREGULAR CYCLE: Primary | ICD-10-CM

## 2025-08-07 RX ORDER — IBUPROFEN 600 MG/1
600 TABLET, FILM COATED ORAL EVERY 8 HOURS PRN
Qty: 30 TABLET | Refills: 0 | Status: SHIPPED | OUTPATIENT
Start: 2025-08-07

## 2025-08-07 NOTE — PROGRESS NOTES
Subjective:      Patient ID: Pilar Lester is a 49 y.o. female.    Vitals:  vitals were not taken for this visit.     Chief Complaint: heavy menstrual bleeding      Visit Type: TELE AUDIOVISUAL    Patient Location: Home     Present with the patient at the time of consultation: TELEMED PRESENT WITH PATIENT: None    Past Medical History:   Diagnosis Date    Asthma      Past Surgical History:   Procedure Laterality Date    CHOLECYSTECTOMY      ESOPHAGOGASTRODUODENOSCOPY      ESOPHAGOGASTRODUODENOSCOPY N/A 12/18/2020    Procedure: EGD (ESOPHAGOGASTRODUODENOSCOPY);  Surgeon: Markel Duff MD;  Location: [facility];  Service: Endoscopy;  Laterality: N/A;    TUBAL LIGATION       Review of patient's allergies indicates:   Allergen Reactions    Trazodone Swelling     Medications Ordered Prior to Encounter[1]  Family History   Problem Relation Name Age of Onset    Breast cancer Mother Dejah Church 50    Cancer Mother Dejah Church     Diabetes Mother Dejah Church     Hypertension Father Abdirashid Church     Asthma Son Rojelio garcia        Medications Ordered                [id number] - [place], [place] - [address]   [address], [place] [address]    Telephone: [phone]   Fax: [phone]   Hours: Not open 24 hours                         E-Prescribed (1 of 1)              ibuprofen (ADVIL,MOTRIN) 600 MG tablet    Sig: Take 1 tablet (600 mg total) by mouth every 8 (eight) hours as needed for Pain.       Start: 8/7/25     Quantity: 30 tablet Refills: 0                           Ohs Peq Odvv Intake    8/7/2025  3:49 PM CDT - Filed by Patient   What is your current physical address in the event of a medical emergency? [address]   Are you able to take your vital signs? No   Please attach any relevant images or files    Ohs Peq Odvv Preferred Language          Cycle began 7/24/25. Has been bleeding since. Spotting initially, heavy bleeding and clotting since 8/2/25. +cramping and abdominal pain.  +light-headed sensation. Going through a box of period underwear per day-7 per box. Seen 8/4/25 virtually and referred to PCP for evaluation. Taking Centrum with iron. Follow-up 8/14/25. Seeking further treatment options at this time.        Constitution: Positive for fatigue.   Cardiovascular:  Negative for chest pain, palpitations, sob on exertion and passing out.   Gastrointestinal:  Positive for abdominal pain.   Genitourinary:  Positive for painful menstruation, irregular menstruation, heavy menstrual bleeding and pelvic pain.   Neurological:  Positive for light-headedness.        Objective:   The physical exam was conducted virtually.  Physical Exam   Constitutional: She is oriented to person, place, and time. She does not appear ill. No distress.   HENT:   Head: Normocephalic and atraumatic.   Nose: Nose normal.   Eyes: Extraocular movement intact   Pulmonary/Chest: Effort normal.   Abdominal: Normal appearance.   Musculoskeletal: Normal range of motion.         General: Normal range of motion.   Neurological: no focal deficit. She is alert and oriented to person, place, and time.   Psychiatric: Her behavior is normal. Mood normal.   Vitals reviewed.      Assessment:     1. Menorrhagia with irregular cycle      Menopause vs Endometriosis vs Fibroids vs other  Concern for anemia  Plan:   Further evaluation will be needed. Labs ordered. Follow-up as scheduled.    Patient encouraged to monitor symptoms closely and instructed to follow-up for new or worsening symptoms. Further, in-person, evaluation is necessary for continued treatment. Please follow up with your primary care doctor or specialist as needed. Verbally discussed plan. Patient confirms understanding and is in agreement with treatment and plan.     You must understand that you've received a [facility] Care evaluation only and that you may be released before all your medical problems are known or treated. You, the patient, will arrange for follow up care  as instructed.    Menorrhagia with irregular cycle  -     ibuprofen (ADVIL,MOTRIN) 600 MG tablet; Take 1 tablet (600 mg total) by mouth every 8 (eight) hours as needed for Pain.  Dispense: 30 tablet; Refill: 0  -     CBC Auto Differential; Future; Expected date: 08/07/2025  -     Comprehensive Metabolic Panel; Future; Expected date: 08/07/2025  -     Follicle Stimulating Hormone; Future; Expected date: 08/07/2025  -     Luteinizing Hormone; Future; Expected date: 08/07/2025  -     TSH; Future; Expected date: 08/07/2025      Patient Instructions   Patient Education     Heavy Periods Discharge Instructions   About this topic   The uterus is the organ where a baby grows when you are pregnant. The uterus is also called the womb. If you do not get pregnant, you get rid of the lining of the uterus each time you have your period. Normally, your body sheds the bloody lining over 3 to 7 days. Most women lose about 2 to 3 tablespoons (30 to 45 mL) of blood each month.  Many things can cause heavy periods. Some are serious things like bleeding disorders or cancer of your uterus. Less serious things like problems with your thyroid, fibroids, side effects from your medicines, or infrequent periods can also cause heavy bleeding during your period.  Treatment may depend on the cause of heavy periods. Treatment is also given to prevent too much blood loss. It can be treated with drugs and, in rare cases, surgery may be done. Sometimes, treatment is not needed.     What care is needed at home?   Ask your doctor what you need to do when you go home. Make sure you ask questions if you do not understand what you need to do.  Take all of your drugs as ordered by the doctor.  Heat may help with the pain. Use a heating pad for no more than 20 minutes at a time. Never go to sleep with a heating pad on as this can cause burns.  You may want to take medicines like ibuprofen, naproxen, or acetaminophen to help with pain.  Stay as active as  you can. Exercise can help to lower the amount of pain you have.  What follow-up care is needed?   Your doctor may ask you to make visits to the office to check on your progress. Be sure to keep your visits.  You may need more tests to know what caused your heavy periods. The results will help your doctor understand what kind of treatment you may need. Together you can make a plan for more care.  Your doctor may send you to a hormone expert if your heavy periods are caused by hormonal and thyroid problems. This kind of doctor is an endocrinologist.  What drugs may be needed?   The doctor may order drugs to:  Balance your hormones. Birth control pills are most often used to do this.  Thicken blood and slow bleeding  Help your periods become more regular, shorter, and lighter  Increase iron in your blood  Help with pain and swelling  Make your ovaries stop working for a while  Will physical activity be limited?   Heavy periods may make you uneasy and may be painful enough to stop or limit you from doing your normal activities. You may feel tired and want to stay at home from work or school.  Talk to your doctor about when you can safely have sex again.  What problems could happen?   Low red blood cells  Painful menstrual cramps  What can be done to prevent this health problem?   There may be no specific way to prevent heavy periods.  When do I need to call the doctor?   You are bleeding so much that you feel very weak or like you might pass out.  You have severe belly pain with your bleeding.  You develop chest discomfort or become short of breath.  You have bleeding that soaks through more than 2 sanitary pads or tampons in an hour for more than 2 hours.  You become very pale or your heart is racing all the time.  Your bleeding continues and you feel weak or become light-headed when you stand up.  You have a period that lasts for more than 8 days.  You soak through a pad or tampon every 1 to 2 hours each time you  have a period.  You need to use both tampons and pads because you are bleeding so much.  You need to change your tampon or pad during the night.  You bleed between periods or have irregular periods that happen more or less often than once a month.  You have pain and bad cramps in your lower belly before or while you are bleeding.  You have any of the symptoms listed above and are low in iron. Signs of being low in iron include:  Feeling weak.  Feeling tired.  Having headaches.  Having trouble breathing when you exercise.  Feeling your heart beat too fast when you exercise.  Teach Back: Helping You Understand   The Teach Back Method helps you understand the information we are giving you. After you talk with the staff, tell them in your own words what you learned. This helps to make sure the staff has described each thing clearly. It also helps to explain things that may have been confusing. Before going home, make sure you can do these:  I can tell you about my condition.  I can tell you what changes I need to make with my diet, drugs, or activities.  I can tell you what I will do if I pass large blood clots, my vaginal bleeding lasts more than a week, or I soak my sanitary pad or tampon each hour.  Last Reviewed Date   2021-06-16  Consumer Information Use and Disclaimer   This generalized information is a limited summary of diagnosis, treatment, and/or medication information. It is not meant to be comprehensive and should be used as a tool to help the user understand and/or assess potential diagnostic and treatment options. It does NOT include all information about conditions, treatments, medications, side effects, or risks that may apply to a specific patient. It is not intended to be medical advice or a substitute for the medical advice, diagnosis, or treatment of a health care provider based on the health care provider's examination and assessment of a patients specific and unique circumstances. Patients must  speak with a health care provider for complete information about their health, medical questions, and treatment options, including any risks or benefits regarding use of medications. This information does not endorse any treatments or medications as safe, effective, or approved for treating a specific patient. [employer] and its affiliates disclaim any warranty or liability relating to this information or the use thereof. The use of this information is governed by the Terms of Use, available at https://www.[employer].com/en/know/clinical-effectiveness-terms   Copyright   Copyright © 2023 [employer] and its affiliates and/or licensors. All rights reserved.                     [1]   Current Outpatient Medications on File Prior to Visit   Medication Sig Dispense Refill    albuterol (ACCUNEB) 0.63 mg/3 mL Nebu albuterol sulfate Take No date recorded No form recorded No frequency recorded No route recorded No set duration recorded No set duration amount recorded active No dosage strength recorded No dosage strength units of measure recorded      azelastine (ASTELIN) 137 mcg (0.1 %) nasal spray 1 spray (137 mcg total) by Nasal route 2 (two) times daily. 30 mL 0    diclofenac sodium (VOLTAREN) 1 % Gel Apply 2 g topically 4 (four) times daily. for 7 days 20 g 0    doxycycline (VIBRAMYCIN) 100 MG Cap       gabapentin (NEURONTIN) 300 MG capsule Take 1 capsule (300 mg total) by mouth 2 (two) times daily. for 7 days 14 capsule 0    methocarbamoL (ROBAXIN) 750 MG Tab Take 1 tablet (750 mg total) by mouth 4 (four) times daily as needed (muscle pain). 40 tablet 0    sertraline (ZOLOFT) 25 MG tablet Take 1 tablet (25 mg total) by mouth once daily. 30 tablet 11    tirzepatide 10 mg/0.5 mL PnIj Inject 10 mg into the skin every 7 days. 0.5 mL 11    [DISCONTINUED] celecoxib (CELEBREX) 100 MG capsule Take 1 capsule (100 mg total) by mouth daily as needed for Pain. (Patient not taking: Reported on 12/8/2023) 30 capsule 0      No current facility-administered medications on file prior to visit.

## 2025-08-07 NOTE — PATIENT INSTRUCTIONS
Patient Education     Heavy Periods Discharge Instructions   About this topic   The uterus is the organ where a baby grows when you are pregnant. The uterus is also called the womb. If you do not get pregnant, you get rid of the lining of the uterus each time you have your period. Normally, your body sheds the bloody lining over 3 to 7 days. Most women lose about 2 to 3 tablespoons (30 to 45 mL) of blood each month.  Many things can cause heavy periods. Some are serious things like bleeding disorders or cancer of your uterus. Less serious things like problems with your thyroid, fibroids, side effects from your medicines, or infrequent periods can also cause heavy bleeding during your period.  Treatment may depend on the cause of heavy periods. Treatment is also given to prevent too much blood loss. It can be treated with drugs and, in rare cases, surgery may be done. Sometimes, treatment is not needed.     What care is needed at home?   Ask your doctor what you need to do when you go home. Make sure you ask questions if you do not understand what you need to do.  Take all of your drugs as ordered by the doctor.  Heat may help with the pain. Use a heating pad for no more than 20 minutes at a time. Never go to sleep with a heating pad on as this can cause burns.  You may want to take medicines like ibuprofen, naproxen, or acetaminophen to help with pain.  Stay as active as you can. Exercise can help to lower the amount of pain you have.  What follow-up care is needed?   Your doctor may ask you to make visits to the office to check on your progress. Be sure to keep your visits.  You may need more tests to know what caused your heavy periods. The results will help your doctor understand what kind of treatment you may need. Together you can make a plan for more care.  Your doctor may send you to a hormone expert if your heavy periods are caused by hormonal and thyroid problems. This kind of doctor is an  endocrinologist.  What drugs may be needed?   The doctor may order drugs to:  Balance your hormones. Birth control pills are most often used to do this.  Thicken blood and slow bleeding  Help your periods become more regular, shorter, and lighter  Increase iron in your blood  Help with pain and swelling  Make your ovaries stop working for a while  Will physical activity be limited?   Heavy periods may make you uneasy and may be painful enough to stop or limit you from doing your normal activities. You may feel tired and want to stay at home from work or school.  Talk to your doctor about when you can safely have sex again.  What problems could happen?   Low red blood cells  Painful menstrual cramps  What can be done to prevent this health problem?   There may be no specific way to prevent heavy periods.  When do I need to call the doctor?   You are bleeding so much that you feel very weak or like you might pass out.  You have severe belly pain with your bleeding.  You develop chest discomfort or become short of breath.  You have bleeding that soaks through more than 2 sanitary pads or tampons in an hour for more than 2 hours.  You become very pale or your heart is racing all the time.  Your bleeding continues and you feel weak or become light-headed when you stand up.  You have a period that lasts for more than 8 days.  You soak through a pad or tampon every 1 to 2 hours each time you have a period.  You need to use both tampons and pads because you are bleeding so much.  You need to change your tampon or pad during the night.  You bleed between periods or have irregular periods that happen more or less often than once a month.  You have pain and bad cramps in your lower belly before or while you are bleeding.  You have any of the symptoms listed above and are low in iron. Signs of being low in iron include:  Feeling weak.  Feeling tired.  Having headaches.  Having trouble breathing when you exercise.  Feeling your  heart beat too fast when you exercise.  Teach Back: Helping You Understand   The Teach Back Method helps you understand the information we are giving you. After you talk with the staff, tell them in your own words what you learned. This helps to make sure the staff has described each thing clearly. It also helps to explain things that may have been confusing. Before going home, make sure you can do these:  I can tell you about my condition.  I can tell you what changes I need to make with my diet, drugs, or activities.  I can tell you what I will do if I pass large blood clots, my vaginal bleeding lasts more than a week, or I soak my sanitary pad or tampon each hour.  Last Reviewed Date   2021-06-16  Consumer Information Use and Disclaimer   This generalized information is a limited summary of diagnosis, treatment, and/or medication information. It is not meant to be comprehensive and should be used as a tool to help the user understand and/or assess potential diagnostic and treatment options. It does NOT include all information about conditions, treatments, medications, side effects, or risks that may apply to a specific patient. It is not intended to be medical advice or a substitute for the medical advice, diagnosis, or treatment of a health care provider based on the health care provider's examination and assessment of a patients specific and unique circumstances. Patients must speak with a health care provider for complete information about their health, medical questions, and treatment options, including any risks or benefits regarding use of medications. This information does not endorse any treatments or medications as safe, effective, or approved for treating a specific patient. [employer] and its affiliates disclaim any warranty or liability relating to this information or the use thereof. The use of this information is governed by the Terms of Use, available at  https://www.[employer].com/en/know/clinical-effectiveness-terms   Copyright   Copyright © 2023 [employer], [employer] and its affiliates and/or licensors. All rights reserved.

## 2025-08-07 NOTE — PROGRESS NOTES
Spoke with pt. Pt stated she is still not doing good and will probably go into ER. Pt was added to wait list for PCP appointment scheduled for 8/14. Ensured there was nothing pt could be assisted with today. Next scheduled follow-up is for 8/12/25.    Laura Li  ED Navigator  [phone]

## 2025-08-08 ENCOUNTER — HOSPITAL ENCOUNTER (OUTPATIENT)
Dept: RADIOLOGY | Facility: HOSPITAL | Age: 50
Discharge: HOME | End: 2025-08-08
Payer: MEDICAID

## 2025-08-08 DIAGNOSIS — Z12.31 ENCOUNTER FOR SCREENING MAMMOGRAM FOR BREAST CANCER: ICD-10-CM

## 2025-08-08 PROCEDURE — 77067 SCR MAMMO BI INCL CAD: CPT | Mod: 26,,, | Performed by: RADIOLOGY

## 2025-08-08 PROCEDURE — 77063 BREAST TOMOSYNTHESIS BI: CPT | Mod: 26,,, | Performed by: RADIOLOGY

## 2025-08-08 PROCEDURE — 77063 BREAST TOMOSYNTHESIS BI: CPT | Mod: TC

## 2025-08-12 ENCOUNTER — PATIENT OUTREACH (OUTPATIENT)
Facility: OTHER | Age: 50
End: 2025-08-12
Payer: MEDICAID

## 2025-08-14 ENCOUNTER — OFFICE VISIT (OUTPATIENT)
Dept: FAMILY MEDICINE | Facility: HOSPITAL | Age: 50
End: 2025-08-14
Payer: MEDICAID

## 2025-08-14 VITALS
OXYGEN SATURATION: 100 % | HEART RATE: 74 BPM | SYSTOLIC BLOOD PRESSURE: 109 MMHG | HEIGHT: 68 IN | WEIGHT: 196.44 LBS | BODY MASS INDEX: 29.77 KG/M2 | DIASTOLIC BLOOD PRESSURE: 74 MMHG

## 2025-08-14 DIAGNOSIS — G47.00 INSOMNIA, UNSPECIFIED TYPE: ICD-10-CM

## 2025-08-14 DIAGNOSIS — N93.9 ABNORMAL UTERINE BLEEDING (AUB): Primary | ICD-10-CM

## 2025-08-14 DIAGNOSIS — E66.811 CLASS 1 OBESITY WITHOUT SERIOUS COMORBIDITY WITH BODY MASS INDEX (BMI) OF 33.0 TO 33.9 IN ADULT, UNSPECIFIED OBESITY TYPE: ICD-10-CM

## 2025-08-14 PROCEDURE — 99213 OFFICE O/P EST LOW 20 MIN: CPT

## 2025-08-14 RX ORDER — HYDROXYZINE PAMOATE 25 MG/1
50 CAPSULE ORAL NIGHTLY
Qty: 180 CAPSULE | Refills: 3 | Status: SHIPPED | OUTPATIENT
Start: 2025-08-14

## 2025-08-14 RX ORDER — OXYCODONE AND ACETAMINOPHEN 7.5; 325 MG/1; MG/1
1 TABLET ORAL ONCE
Qty: 1 TABLET | Refills: 0 | Status: SHIPPED | OUTPATIENT
Start: 2025-08-14 | End: 2025-08-14

## 2025-08-14 RX ORDER — TIRZEPATIDE 10 MG/.5ML
10 INJECTION, SOLUTION SUBCUTANEOUS
Qty: 0.5 ML | Refills: 3 | Status: SHIPPED | OUTPATIENT
Start: 2025-08-14

## 2025-08-15 ENCOUNTER — PATIENT MESSAGE (OUTPATIENT)
Dept: FAMILY MEDICINE | Facility: HOSPITAL | Age: 50
End: 2025-08-15
Payer: MEDICAID

## 2025-08-15 ENCOUNTER — TELEPHONE (OUTPATIENT)
Dept: FAMILY MEDICINE | Facility: HOSPITAL | Age: 50
End: 2025-08-15
Payer: MEDICAID

## 2025-08-15 ENCOUNTER — PATIENT OUTREACH (OUTPATIENT)
Facility: OTHER | Age: 50
End: 2025-08-15
Payer: MEDICAID

## 2025-08-25 DIAGNOSIS — E66.811 CLASS 1 OBESITY WITHOUT SERIOUS COMORBIDITY WITH BODY MASS INDEX (BMI) OF 33.0 TO 33.9 IN ADULT, UNSPECIFIED OBESITY TYPE: ICD-10-CM

## 2025-08-25 RX ORDER — TIRZEPATIDE 10 MG/.5ML
10 INJECTION, SOLUTION SUBCUTANEOUS
Qty: 0.5 ML | Refills: 3 | OUTPATIENT
Start: 2025-08-25

## 2025-09-04 ENCOUNTER — PROCEDURE VISIT (OUTPATIENT)
Dept: FAMILY MEDICINE | Facility: HOSPITAL | Age: 50
End: 2025-09-04
Payer: MEDICAID

## 2025-09-04 VITALS
OXYGEN SATURATION: 100 % | BODY MASS INDEX: 30.5 KG/M2 | DIASTOLIC BLOOD PRESSURE: 84 MMHG | WEIGHT: 201.25 LBS | HEIGHT: 68 IN | HEART RATE: 101 BPM | SYSTOLIC BLOOD PRESSURE: 124 MMHG

## 2025-09-04 DIAGNOSIS — N93.9 ABNORMAL UTERINE BLEEDING (AUB): Primary | ICD-10-CM

## 2025-09-04 RX ORDER — PHENTERMINE HYDROCHLORIDE 37.5 MG/1
18.75 TABLET ORAL 2 TIMES DAILY
COMMUNITY
Start: 2025-05-17

## 2025-09-04 RX ORDER — METRONIDAZOLE 500 MG/1
500 TABLET ORAL 2 TIMES DAILY
COMMUNITY
Start: 2025-06-06

## 2025-09-04 RX ORDER — TOPIRAMATE 25 MG/1
25 TABLET, FILM COATED ORAL NIGHTLY
COMMUNITY
Start: 2025-05-17

## 2025-09-04 RX ORDER — OXYCODONE AND ACETAMINOPHEN 7.5; 325 MG/1; MG/1
TABLET ORAL
COMMUNITY
Start: 2025-09-03

## 2025-09-06 RX ORDER — KETOROLAC TROMETHAMINE 30 MG/ML
30 INJECTION, SOLUTION INTRAMUSCULAR; INTRAVENOUS
Status: SHIPPED | OUTPATIENT
Start: 2025-09-04